# Patient Record
Sex: FEMALE | Race: BLACK OR AFRICAN AMERICAN | NOT HISPANIC OR LATINO | Employment: OTHER | URBAN - METROPOLITAN AREA
[De-identification: names, ages, dates, MRNs, and addresses within clinical notes are randomized per-mention and may not be internally consistent; named-entity substitution may affect disease eponyms.]

---

## 2021-02-05 ENCOUNTER — OFFICE VISIT (OUTPATIENT)
Dept: URGENT CARE | Facility: URGENT CARE | Age: 81
End: 2021-02-05
Payer: COMMERCIAL

## 2021-02-05 VITALS
HEART RATE: 83 BPM | DIASTOLIC BLOOD PRESSURE: 72 MMHG | TEMPERATURE: 98.5 F | SYSTOLIC BLOOD PRESSURE: 140 MMHG | OXYGEN SATURATION: 98 %

## 2021-02-05 DIAGNOSIS — R53.83 OTHER FATIGUE: ICD-10-CM

## 2021-02-05 DIAGNOSIS — R42 DIZZINESS: Primary | ICD-10-CM
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
## 2021-02-05 DIAGNOSIS — E11.9 TYPE 2 DIABETES MELLITUS WITHOUT COMPLICATION, WITHOUT LONG-TERM CURRENT USE OF INSULIN (H): ICD-10-CM

## 2021-02-05 DIAGNOSIS — R42 VERTIGO: ICD-10-CM

## 2021-02-05 DIAGNOSIS — I10 HYPERTENSION, WELL CONTROLLED: ICD-10-CM

## 2021-02-05 PROCEDURE — 99204 OFFICE O/P NEW MOD 45 MIN: CPT | Performed by: INTERNAL MEDICINE

## 2021-02-05 RX ORDER — LOSARTAN POTASSIUM AND HYDROCHLOROTHIAZIDE 12.5; 5 MG/1; MG/1
1 TABLET ORAL
COMMUNITY
Start: 2021-01-13 | End: 2021-02-25 | Stop reason: ALTCHOICE

## 2021-02-05 RX ORDER — ATORVASTATIN CALCIUM 40 MG/1
40 TABLET, FILM COATED ORAL
COMMUNITY
Start: 2021-01-19 | End: 2021-02-24

## 2021-02-05 ASSESSMENT — ENCOUNTER SYMPTOMS
NAUSEA: 0
HEADACHES: 0
SHORTNESS OF BREATH: 0
DECREASED CONCENTRATION: 0
CONFUSION: 0
FATIGUE: 0
FEVER: 0
PALPITATIONS: 0
SPEECH DIFFICULTY: 0
FACIAL ASYMMETRY: 0
NUMBNESS: 0
SORE THROAT: 0
VOMITING: 0
WEAKNESS: 0
DIFFICULTY URINATING: 0

## 2021-02-05 NOTE — PROGRESS NOTES
Phone :  janine  Off phone  at 11:32    ASSESSMENT:      ICD-10-CM    1. Dizziness  R42    2. Fall, initial encounter  W19.XXXA    3. Type 2 diabetes mellitus without complication, without long-term current use of insulin (H)  E11.9    4. Hypertension, well controlled  I10    5. Vertigo  R42    6. Other fatigue  R53.83         Medical Decision Making:  New onset dizziness, sounds vertigo-like with unstable gait, greatest concern would be vertebral basilar insufficiency/stroke  Refer to emergency for evaluation and treatment.    Other causes could include cardiac ischemia,arrhythmia, electrolyte derangement, hypoglycemia, dehydration    Serious Comorbid Conditions:  Adult:  Diabetes and HTN    PLAN:  Followup:    Transfer to ED via private car    Patient Instructions   Concern for new onset dizziness with history diabetes mellitus and age, could be potential stroke    Please take to ER for evaluation.  They will evaluation for stroke, dehydration, abnormal electrolytes, blood sugars, heart problems    Daughter to drive to ER          SUBJECTIVE:   Dano Bowser is a 80 year old female presenting with a chief complaint of   Chief Complaint   Patient presents with     Urgent Care     Dizziness     dizziness x 3 days. fell when she got out ot bed Wednesday.          She is a new patient of [facility].    accompanied by her daughter who also gives history  Used phone  :        Dizziness Symptom Onset: 3 day(s) ago.  When nicole from bed, she fell  patient told her daughter she was dizzy  All day yesterday had dizziness  No previous occurrence      Character: everything feels upside down, hard to get up.  Associated Symptoms: none.  After lies down & gets up - causes dizziness  Makes hard to walk    Cardiac risk factors: diabetes mellitus, hypertension and hypercholesterolemia/hyperlipidemia.    Home /80s    denies stoke or heart problem    [facility]   & told looked good by letter  Reviewed letter 1.14.2021.  CMP Lipid normal   Higher blood sugars -recommended diet changes    Reviewed labs _ a1c 7.3,  ,  LDL 48  Na 134    Review of Systems   Constitutional: Negative for fatigue (sometimes) and fever.   HENT: Negative for sore throat.    Eyes: Negative for visual disturbance.   Respiratory: Negative for shortness of breath. Cough: sometimes.    Cardiovascular: Negative for chest pain, palpitations and leg swelling.   Gastrointestinal: Negative for nausea and vomiting.   Genitourinary: Negative.  Negative for difficulty urinating.   Musculoskeletal: Negative for gait problem (gait is not the same as before after fall).   Skin: Negative for rash.        Itchy skin.  tx HC cream   Neurological: Negative for syncope, facial asymmetry, speech difficulty, weakness, numbness and headaches.   Psychiatric/Behavioral: Negative for confusion and decreased concentration.        Takes medications.  Remembers to do this       Past Medical History:   Diagnosis Date     Benign essential hypertension      Diabetes mellitus (H)      High cholesterol      No family history on file.  Current Outpatient Medications   Medication Sig Dispense Refill     atorvastatin (LIPITOR) 40 MG tablet Take 40 mg by mouth       losartan-hydrochlorothiazide (HYZAAR) 50-12.5 MG tablet Take 1 tablet by mouth       metFORMIN (GLUCOPHAGE) 850 MG tablet Take 850 mg by mouth       Social History     Tobacco Use     Smoking status: Never Smoker     Smokeless tobacco: Never Used   Substance Use Topics     Alcohol use: Not on file       OBJECTIVE  BP (!) 140/72   Pulse 83   Temp 98.5  F (36.9  C) (Oral)   SpO2 98%     Physical Exam  Vitals signs reviewed.   Constitutional:       General: She is not in acute distress.     Appearance: Normal appearance. She is not ill-appearing, toxic-appearing or diaphoretic.   HENT:      Mouth/Throat:      Mouth: Mucous membranes are moist.   Eyes:       Conjunctiva/sclera: Conjunctivae normal.   Cardiovascular:      Rate and Rhythm: Normal rate and regular rhythm.      Pulses: Normal pulses.      Heart sounds: Normal heart sounds.   Pulmonary:      Effort: Pulmonary effort is normal.      Breath sounds: Normal breath sounds.   Abdominal:      Palpations: Abdomen is soft.   Musculoskeletal:      Right lower leg: No edema.      Left lower leg: No edema.   Neurological:      Mental Status: She is alert and oriented to person, place, and time.      Cranial Nerves: No cranial nerve deficit.      Coordination: Coordination abnormal.      Gait: Gait abnormal (unstable gait).      Comments: Standing causing dizziness, unable to participate in coordination with finger-nose-finger exam   Psychiatric:         Mood and Affect: Mood normal.         Behavior: Behavior normal.         Thought Content: Thought content normal.         Judgment: Judgment normal.         Labs:  No results found for this or any previous visit (from the past 24 hour(s)).

## 2021-02-05 NOTE — PATIENT INSTRUCTIONS
Concern for new onset dizziness with history diabetes mellitus and age, could be potential stroke    Please take to ER for evaluation.  They will evaluation for stroke, dehydration, abnormal electrolytes, blood sugars, heart problems    Daughter to drive to ER

## 2021-02-07 PROBLEM — E78.5 HYPERLIPIDEMIA: Status: ACTIVE | Noted: 2018-06-30

## 2021-02-07 PROBLEM — R22.2 MASS OF CHEST WALL, RIGHT: Status: RESOLVED | Noted: 2018-06-30 | Resolved: 2021-02-07

## 2021-02-07 PROBLEM — E55.9 VITAMIN D DEFICIENCY: Status: ACTIVE | Noted: 2018-06-30

## 2021-02-07 PROBLEM — K57.10: Status: ACTIVE | Noted: 2018-06-30

## 2021-02-07 PROBLEM — I10 ESSENTIAL HYPERTENSION: Status: ACTIVE | Noted: 2018-06-30

## 2021-02-07 PROBLEM — E04.2 MULTINODULAR GOITER: Status: ACTIVE | Noted: 2018-06-30

## 2021-02-07 PROBLEM — E87.1 ACUTE HYPONATREMIA: Status: RESOLVED | Noted: 2018-06-30 | Resolved: 2021-02-07

## 2021-02-07 PROBLEM — E11.9 CONTROLLED TYPE 2 DIABETES MELLITUS WITHOUT COMPLICATION, WITHOUT LONG-TERM CURRENT USE OF INSULIN (H): Status: ACTIVE | Noted: 2018-06-30

## 2021-02-07 PROBLEM — E87.1 ACUTE HYPONATREMIA: Status: ACTIVE | Noted: 2018-06-30

## 2021-02-07 PROBLEM — K44.9 HIATAL HERNIA: Status: ACTIVE | Noted: 2018-06-30

## 2021-02-07 PROBLEM — R22.2 MASS OF CHEST WALL, RIGHT: Status: ACTIVE | Noted: 2018-06-30

## 2021-02-08 ENCOUNTER — TELEPHONE (OUTPATIENT)
Dept: FAMILY MEDICINE | Facility: CLINIC | Age: 81
End: 2021-02-08

## 2021-02-08 ENCOUNTER — TELEPHONE (OUTPATIENT)
Dept: PEDIATRICS | Facility: CLINIC | Age: 81
End: 2021-02-08

## 2021-02-08 ENCOUNTER — VIRTUAL VISIT (OUTPATIENT)
Dept: FAMILY MEDICINE | Facility: CLINIC | Age: 81
End: 2021-02-08
Payer: COMMERCIAL

## 2021-02-08 DIAGNOSIS — R42 DIZZINESS: ICD-10-CM

## 2021-02-08 DIAGNOSIS — N39.0 E-COLI UTI: ICD-10-CM

## 2021-02-08 DIAGNOSIS — E11.9 CONTROLLED TYPE 2 DIABETES MELLITUS WITHOUT COMPLICATION, WITHOUT LONG-TERM CURRENT USE OF INSULIN (H): ICD-10-CM

## 2021-02-08 DIAGNOSIS — Z86.79 H/O LEFT BUNDLE BRANCH BLOCK: ICD-10-CM

## 2021-02-08 DIAGNOSIS — B96.20 E-COLI UTI: ICD-10-CM

## 2021-02-08 DIAGNOSIS — R53.83 FATIGUE, UNSPECIFIED TYPE: ICD-10-CM

## 2021-02-08 DIAGNOSIS — R00.2 PALPITATIONS: ICD-10-CM

## 2021-02-08 DIAGNOSIS — B96.89 UTI DUE TO KLEBSIELLA SPECIES: ICD-10-CM

## 2021-02-08 DIAGNOSIS — E55.9 VITAMIN D DEFICIENCY: ICD-10-CM

## 2021-02-08 DIAGNOSIS — Z91.81 PERSONAL HISTORY OF FALL: ICD-10-CM

## 2021-02-08 DIAGNOSIS — E87.1 HYPONATREMIA: ICD-10-CM

## 2021-02-08 DIAGNOSIS — N39.0 UTI DUE TO KLEBSIELLA SPECIES: ICD-10-CM

## 2021-02-08 DIAGNOSIS — R26.81 UNSTEADY GAIT: ICD-10-CM

## 2021-02-08 DIAGNOSIS — I10 ESSENTIAL HYPERTENSION: ICD-10-CM

## 2021-02-08 DIAGNOSIS — D64.9 CHRONIC ANEMIA: ICD-10-CM

## 2021-02-08 DIAGNOSIS — E04.2 MULTINODULAR GOITER: ICD-10-CM

## 2021-02-08 DIAGNOSIS — E78.5 HYPERLIPIDEMIA, UNSPECIFIED HYPERLIPIDEMIA TYPE: ICD-10-CM

## 2021-02-08 DIAGNOSIS — Z09 HOSPITAL DISCHARGE FOLLOW-UP: Primary | ICD-10-CM

## 2021-02-08 DIAGNOSIS — E11.9 CONTROLLED TYPE 2 DIABETES MELLITUS WITHOUT COMPLICATION, WITHOUT LONG-TERM CURRENT USE OF INSULIN (H): Primary | ICD-10-CM

## 2021-02-08 PROCEDURE — 99215 OFFICE O/P EST HI 40 MIN: CPT | Mod: 95 | Performed by: FAMILY MEDICINE

## 2021-02-08 RX ORDER — SULFAMETHOXAZOLE AND TRIMETHOPRIM 400; 80 MG/1; MG/1
1 TABLET ORAL 2 TIMES DAILY
Qty: 14 TABLET | Refills: 0 | Status: SHIPPED | OUTPATIENT
Start: 2021-02-08 | End: 2021-02-15

## 2021-02-08 RX ORDER — GLUCOSAMINE HCL/CHONDROITIN SU 500-400 MG
CAPSULE ORAL
Qty: 100 EACH | Refills: 3 | Status: SHIPPED | OUTPATIENT
Start: 2021-02-08 | End: 2021-03-17

## 2021-02-08 RX ORDER — CALCIUM CITRATE/VITAMIN D3 200MG-6.25
1 TABLET ORAL DAILY
Qty: 100 STRIP | Refills: 0 | Status: SHIPPED | OUTPATIENT
Start: 2021-02-08 | End: 2021-02-08

## 2021-02-08 RX ORDER — LANCETS
EACH MISCELLANEOUS
Qty: 100 EACH | Refills: 6 | Status: SHIPPED | OUTPATIENT
Start: 2021-02-08

## 2021-02-08 NOTE — PATIENT INSTRUCTIONS
Glad doing better  Recheck labs this week  See back in couple weeks  Bactrim twice a day with food 7 days fo rurine infection  See me in 2 weeks  Or earlier depending on value and symptoms  depending on how doing may need cardiolgy and neurology given LBB, age and dizziness, they want to wait on this till labs and apt done  Will wait to refill med and address disability parking at apt with me in person in 1 to 2 weeks  continue care with eye   Go to the  ER if worse in any way

## 2021-02-08 NOTE — PROGRESS NOTES
Dano is a 80 year old who is being evaluated via a billable telephone visit.      What phone number would you like to be contacted at? On file  How would you like to obtain your AVS? Mail a copy  Assessment & Plan     Hospital discharge follow-up  Seems to be doing better  Recheck labs this week given low sodium and chronic anemia  See back in couple weeks  Will make further recommendations once labs seen and evaluate in person  Will need to review records in care every where in more detail as limited at first time apt today to get access to records and update rest of PMH/ PSH/ FH/ SOc , etc  Bactrim twice a day with food 7 days for what appears to be Klebsiella and E coli UTI   See me in person 2 weeks  Or earlier depending on lab values and symptoms  Depending on how she is doing may need cardiology and neurology given LBBB, age and dizziness, they want to wait on this till labs and apt done  Will wait to refill med and address disability parking at apt with me in person in 1 to 2 weeks  Continue care with eye   To go to the  ER if worse in any way    - CBC with platelets differential; Future  - Comprehensive metabolic panel; Future    Dizziness  Improved recheck labs and consider neuro/ pt consult  - CBC with platelets differential; Future  - Comprehensive metabolic panel; Future  - CARDIOLOGY EVAL ADULT REFERRAL; Future  - NEUROLOGY ADULT REFERRAL    Palpitations  Resolved   - CBC with platelets differential; Future  - Comprehensive metabolic panel; Future  - CARDIOLOGY EVAL ADULT REFERRAL; Future    H/O left bundle branch block  Seen on EKG on 2/5/2021 in ER, not sure if new or old, will need to review outside chart in more detail. Currently no chest symptoms  - CARDIOLOGY EVAL ADULT REFERRAL; Future    Hyponatremia  Acute intermittent has had in the past , on a diuretic. Will see what repeat labs show  - Comprehensive metabolic panel; Future    UTI due to Klebsiella species  - CBC with platelets differential;  Future  - Comprehensive metabolic panel; Future  - sulfamethoxazole-trimethoprim (BACTRIM) 400-80 MG tablet; Take 1 tablet by mouth 2 times daily for 7 days    E-coli UTI  - CBC with platelets differential; Future  - Comprehensive metabolic panel; Future  - sulfamethoxazole-trimethoprim (BACTRIM) 400-80 MG tablet; Take 1 tablet by mouth 2 times daily for 7 days    Fatigue, unspecified type  Resolved   - CBC with platelets differential; Future  - Comprehensive metabolic panel; Future    Unsteady gait  Improved   - CBC with platelets differential; Future  - Comprehensive metabolic panel; Future    Personal history of fall  - CBC with platelets differential; Future  - NEUROLOGY ADULT REFERRAL    Chronic anemia  Hb in past 11.1 now in er was 10.6. no hx of bruising or bleeding form anywhere. Will recheck and review outside records and go from there. Could be anemia or chronic disease  - Iron and iron binding capacity; Future  - Vitamin B12; Future    Controlled type 2 diabetes mellitus without complication, without long-term current use of insulin (H)  On metformin, will see if kidney function allow to continue or needs tweaking, on arb and statin. Will discuss as at next apt. Risk benefit given age to be considered as well.   - Comprehensive metabolic panel; Future  - Lipid panel reflex to direct LDL Fasting; Future  - TSH with free T4 reflex; Future  - Hemoglobin A1c; Future  - Albumin Random Urine Quantitative with Creat Ratio; Future  - blood glucose (TRUE METRIX BLOOD GLUCOSE TEST) test strip; 1 strip by In Vitro route daily Use to test blood sugar 1 times daily or as directed.    Essential hypertension  Noted stable on losartan hydrochlorothiazide  - Comprehensive metabolic panel; Future  - TSH with free T4 reflex; Future  - Albumin Random Urine Quantitative with Creat Ratio; Future    Hyperlipidemia, unspecified hyperlipidemia type  On statin  - Lipid panel reflex to direct LDL Fasting; Future  - TSH with free T4  reflex; Future    Multinodular goiter  - TSH with free T4 reflex; Future    Vitamin D deficiency  - Vitamin D Deficiency; Future    Review of external notes as documented above   Review of prior external note(s) from - [facility]Everywhere information from [facility]  reviewed  Review of the result(s) of each unique test - cbc, cmp, ek, cxr, mri, ua, Ucx  Independent interpretation of a test performed by another physician/other qualified health care professional (not separately reported) - UCx  Diagnosis or treatment significantly limited by social determinants of health - spoke with help of daughter and , cultural and linguistic and hearing barriers in place     65 minutes spent on the date of the encounter doing chart review, history and exam, documentation and further activities as noted above    Regular exercise  See Patient Instructions    Return in about 2 weeks (around 2/22/2021) for Follow up, with me, in person.    Cee Lozano MD  [facility]    Fili Matson is a 80 year old who presents to clinic today for the following health issues  accompanied by her Maltese  and daughter:    HPI     ED/UC Follow up:  Facility:  Emergency Dept  Date of visit: 2/5/2021  Reason for visit:   Dizziness (Primary Dx);   Chest pain, unspecified type;   Hyponatremia   pt state not chest pain but fast heartbeat   Current Status:  Seem dizziness sometimes when laying down, no pain, no fast heart beat.  Pt state can she drink V8 juice for dinner, is it okay to drink v8.          80 yr old [place] lady with hx of DM on metformin( HBA1c 7.3 in 1/[facility]), HLD on Lipitor, HTN on losartan-hydrochlorothiazide, small asymptomatic hiatal hernia noted on CT in 2018 ad asymptomatic diverticulum of colon noted at that time too, vit d deficiency on calcium and vit d, h of resolved hyponatremia secondary to GE in 2018 and a right chest wall  lipoma s/p excision in 2018, with SNHL supposed to be wearing hearing aids under care of ENT / audiology at [facility], resolved ceruminous, hx of periodontal disease & tooth extraction of a fractured tooth in nov 2019.  Under care of dentist and allergic conjunctivitis under care of eye, under care of PCP Chelsea Dominguez at [facility] many years, last got labs for her and meds refilled in Jan 2021.  Seen in  first time in [place] system on 2/5 for complain of dizziness vertigo, fatigue, hx of a fall & unsteady gait. Was referred to the ER to evaluate for stroke. Apparently seen at [facility] ER for chest pain that same day. No notes to review of that ER visit prior to apt time & limited records could only be seen using care every where prior to actual visit.    But a follow up apt was made by [facility] ER to follow up on recent ER visit for chest pain today by telephone with this provider. New to this provider and clinic. There is a cultural and lingual barrier ( speaks Hebrew) and an  was to be utilized.   Also just prior to the apt , [facility] ER informed triage that Patient was seen in their ER Friday 2/5/2021 for Chest Pain. Noted had been asymptomatic for urinary concerns but they did do a UA/UC and patient was not sent home on any antibiotics & later Culture grew out: >100,000 klebsiella pneumoniae & > 100,000 e coli, noting that Both were pan sensitive EXCEPT the Klebsiella was resistant to nitrofurantoin. Missed apt at 1120 as daughter at work and rescheduled to 420 pm today     TE done with Hebrew  and daughter. Patient has SNHL and does not speak on phone. Only hear daughter  Notes transferred to [place] due to insurance change    Notes form [facility] ER in care every where reviewed as below:    On 2/5/2021 80-year-old female, past medical history of type 2 diabetes, hyponatremia, who presents emergency department for evaluation of dizziness. Patient obtained through  . History limited due to patient's hearing impairment. For the last 2 days, patient has had dizziness. She states she got out of bed 2 days ago and fell. She did not seek medical evaluation at that time. Since that fall, she has had persistent dizziness and  heart pain . Patient was seen at an urgent care today for her symptoms. There was concern for posterior circulation stroke, referred to the emergency department. She is able to walk but takes short steps. No weakness in her arms or legs. No vision changes. No abdominal pain. No difficulty breathing. No recent fever or chills. No cough. No other concerns.   Mildly hypertensive on arrival otherwise normal vitals. There is very limited ability to obtain a full neurologic exam given that the patient is very hard of hearing and non-English speaking. However, I do not appreciate any focal neurologic deficits. Differential brought would include stroke, electrolyte derangement, anemia, sepsis, ACS and others. Plan for laboratory workup, chest x-ray and MRI. Disposition pending.  Venus Ervin MD    Clarifies she did not have chest pain just dizziness, and when the doctor was examining her felt her heart was beating fast  1339 Urinalysis shows small amount leukocyte esterase and white cells, however contaminated with mucus. History inconsistent with UTI   [GG]   1350 Mild leukocytosis, however similar to values obtained last year.   [GG]   1350 IMPRESSION: Shallow inspiration. Heart size is normal. Minimal plate like atelectasis at the left lung base. The right lung is clear. No pleural effusions. No pneumothorax. The bones are demineralized. There is degenerative change in the spine and left shoulder.   [GG]   1410 Sodium(!): 127 [GG]   1410 Hemoglobin at baseline.   [GG]   1421 Troponin I: 0.03 [GG]   1423 EKG shows LBBB morphology, sinus rhythm, 2-3mm ST elevation in leads V2-V4, does not meet sgarbossa criteria. No previous EKG for comparison   [GG]    1540 Sign out received, assumed care at this time.   [BS]   3939 IMPRESSION:  1. No evidence of acute infarction or other acute intracranial abnormality.  2. Findings compatible with moderate small vessel ischemic change.  3. Mild generalized atrophy.   MR Brain WO IV Cont [BS]   9533 Workup reviewed:  -Na 127 (134)  -Mild leukocytosis with WBC 13.1  -UA with many bacteria, 14 WBC, small LE -- suspect 2/2 contamination as patient without any urinary symptoms or tenderness on exam. Will send to Cx   [BS]   5314 Reassessed patient. Endorsing some dizziness but no chest pain or other symptoms. Discussed that feel admission for further workup and management about be reasonable as she is still experiencing dizziness, however, patient is adamant that she does not want to stay and wishes to leave. Discussed risks and benefits of this decision and I believe patient has the capacity to make the decision. Will discuss with CM to help coordinate follow up with PCP on Monday    Notes was hydrated with 0.9 % normal saline. Vertigo resolved, dizziness better now only when gets up suddenly. No longer with palpitations. Feeling fine now. Still having dizziness when lying down on getting up from lying down , has to get up slowly as feels dizzy    Currently No fever or chills, no headache, no double or blurry vision, no facial pain, earache, sore throat, runny nose, post nasal drip, no trouble smelling, tasting or swallowing, no cough , no chest pain, trouble breathing or palpitations,   No abdominal pain, heart burn, reflux, nausea or vomiting  Hx of diarrhea with bernice products, avoids dairy  No constipation, no blood in stools or black stools, no weight loss or night sweats.   Has decreased hearing  daughter and  did all the talking    Notes some Urge incontinence & wears diapers. No dysuria, hematuria, frequency, hesitancy, No pelvic complaints.   No leg swelling or joint pain. No rash.    Uses hydrocortisone for a  chronic skin patch    DM : when sick FS was 138 , when in ER given saline, went down to , when done with saline measured again & went up to 144, wanted more saline to lower BP and was advised to stay overnight but didn't  HTN not checking at home, taking losartan hydrochlorothiazide  HLD on statin  Taking meds as previous  multinodular goiter, TSH wnl in past   Hyponatremia in past as well when dehydrated    No more falls, fatigue improved    Will need med refills by next month    Review of Systems   Constitutional, HEENT, cardiovascular, pulmonary, GI, , musculoskeletal, neuro, skin, endocrine and psych systems are negative, except as otherwise noted.      Objective           Vitals:  No vitals were obtained today due to virtual visit.    Physical Exam   healthy, alert, no distress and cooperative  PSYCH: Alert but unable to evaluate more than that as hard of hearing and daughter did all the talking on the phone, RESP: No cough, no audible wheezing, able to talk in full sentences  Remainder of exam unable to be completed due to telephone visits    No results found for any previous visit.     No results found for any visits on 02/08/21.        Phone call duration: 40 minutes

## 2021-02-08 NOTE — TELEPHONE ENCOUNTER
Received call from [facility] on [facility]    Diabetic test strips that were ordered today are not covered by pt's insurance  Pharmacy would like to substitute with One Touch Verio  If okay, then they need new orders orders for all diabetic testing supplies  Meter, lancets, strips    Thank you  Will Hastings RN on 2/8/2021 at 5:25 PM

## 2021-02-08 NOTE — TELEPHONE ENCOUNTER
[facility]  Patient was seen in their ER Friday 2/5/2021 for Chest Pain  Was asymptomatic for urinary concerns but they did do a UA/UC and patient was not sent home on any antibiotics  Culture grew out:  >100,000 klebsiella pneumoniae  > 100,000 e coli  Both are pan sensitive EXCEPT the Klebsiella is resistant to nitrofurantoin.  Patient has 1120 virtual with an  with Dr Lozano at 1120 today.  Preethi Recinos RN  [facility]

## 2021-02-09 ENCOUNTER — APPOINTMENT (OUTPATIENT)
Dept: INTERPRETER SERVICES | Facility: CLINIC | Age: 81
End: 2021-02-09
Payer: COMMERCIAL

## 2021-02-15 DIAGNOSIS — E55.9 VITAMIN D DEFICIENCY: ICD-10-CM

## 2021-02-15 DIAGNOSIS — R26.81 UNSTEADY GAIT: ICD-10-CM

## 2021-02-15 DIAGNOSIS — I10 ESSENTIAL HYPERTENSION: ICD-10-CM

## 2021-02-15 DIAGNOSIS — Z91.81 PERSONAL HISTORY OF FALL: ICD-10-CM

## 2021-02-15 DIAGNOSIS — R42 DIZZINESS: ICD-10-CM

## 2021-02-15 DIAGNOSIS — E78.5 HYPERLIPIDEMIA, UNSPECIFIED HYPERLIPIDEMIA TYPE: ICD-10-CM

## 2021-02-15 DIAGNOSIS — E11.9 CONTROLLED TYPE 2 DIABETES MELLITUS WITHOUT COMPLICATION, WITHOUT LONG-TERM CURRENT USE OF INSULIN (H): ICD-10-CM

## 2021-02-15 DIAGNOSIS — D64.9 CHRONIC ANEMIA: ICD-10-CM

## 2021-02-15 DIAGNOSIS — R00.2 PALPITATIONS: ICD-10-CM

## 2021-02-15 DIAGNOSIS — N39.0 E-COLI UTI: ICD-10-CM

## 2021-02-15 DIAGNOSIS — B96.89 UTI DUE TO KLEBSIELLA SPECIES: ICD-10-CM

## 2021-02-15 DIAGNOSIS — E87.1 HYPONATREMIA: ICD-10-CM

## 2021-02-15 DIAGNOSIS — B96.20 E-COLI UTI: ICD-10-CM

## 2021-02-15 DIAGNOSIS — E04.2 MULTINODULAR GOITER: ICD-10-CM

## 2021-02-15 DIAGNOSIS — Z09 HOSPITAL DISCHARGE FOLLOW-UP: ICD-10-CM

## 2021-02-15 DIAGNOSIS — N39.0 UTI DUE TO KLEBSIELLA SPECIES: ICD-10-CM

## 2021-02-15 DIAGNOSIS — R53.83 FATIGUE, UNSPECIFIED TYPE: ICD-10-CM

## 2021-02-15 LAB
ALBUMIN SERPL-MCNC: 3.2 G/DL (ref 3.4–5)
ALP SERPL-CCNC: 148 U/L (ref 40–150)
ALT SERPL W P-5'-P-CCNC: 30 U/L (ref 0–50)
ANION GAP SERPL CALCULATED.3IONS-SCNC: 7 MMOL/L (ref 3–14)
AST SERPL W P-5'-P-CCNC: 19 U/L (ref 0–45)
BASOPHILS NFR BLD AUTO: 0.5 %
BILIRUB SERPL-MCNC: 0.3 MG/DL (ref 0.2–1.3)
BUN SERPL-MCNC: 23 MG/DL (ref 7–30)
CALCIUM SERPL-MCNC: 9.6 MG/DL (ref 8.5–10.1)
CHLORIDE SERPL-SCNC: 100 MMOL/L (ref 94–109)
CHOLEST SERPL-MCNC: 110 MG/DL
CO2 SERPL-SCNC: 25 MMOL/L (ref 20–32)
CREAT SERPL-MCNC: 1.18 MG/DL (ref 0.52–1.04)
CREAT UR-MCNC: 93 MG/DL
DIFFERENTIAL METHOD BLD: ABNORMAL
EOSINOPHIL NFR BLD AUTO: 2.2 %
ERYTHROCYTE [DISTWIDTH] IN BLOOD BY AUTOMATED COUNT: 12.9 % (ref 10–15)
GFR SERPL CREATININE-BSD FRML MDRD: 43 ML/MIN/{1.73_M2}
GLUCOSE SERPL-MCNC: 133 MG/DL (ref 70–99)
HBA1C MFR BLD: 6.3 % (ref 0–5.6)
HCT VFR BLD AUTO: 33.4 % (ref 35–47)
HDLC SERPL-MCNC: 53 MG/DL
HGB BLD-MCNC: 10.9 G/DL (ref 11.7–15.7)
IRON SATN MFR SERPL: 16 % (ref 15–46)
IRON SERPL-MCNC: 63 UG/DL (ref 35–180)
LDLC SERPL CALC-MCNC: 34 MG/DL
LYMPHOCYTES NFR BLD AUTO: 20.6 %
MCH RBC QN AUTO: 28.7 PG (ref 26.5–33)
MCHC RBC AUTO-ENTMCNC: 32.6 G/DL (ref 31.5–36.5)
MCV RBC AUTO: 88 FL (ref 78–100)
MICROALBUMIN UR-MCNC: 5 MG/L
MICROALBUMIN/CREAT UR: 5.49 MG/G CR (ref 0–25)
MONOCYTES NFR BLD AUTO: 7.2 %
NEUTROPHILS NFR BLD AUTO: 69.5 %
NONHDLC SERPL-MCNC: 57 MG/DL
PLATELET # BLD AUTO: 378 10E9/L (ref 150–450)
POTASSIUM SERPL-SCNC: 4.7 MMOL/L (ref 3.4–5.3)
PROT SERPL-MCNC: 7.6 G/DL (ref 6.8–8.8)
RBC # BLD AUTO: 3.8 10E12/L (ref 3.8–5.2)
SODIUM SERPL-SCNC: 132 MMOL/L (ref 133–144)
TIBC SERPL-MCNC: 382 UG/DL (ref 240–430)
TRIGL SERPL-MCNC: 117 MG/DL
TSH SERPL DL<=0.005 MIU/L-ACNC: 0.52 MU/L (ref 0.4–4)
VIT B12 SERPL-MCNC: 1081 PG/ML (ref 193–986)
WBC # BLD AUTO: 12 10E9/L (ref 4–11)

## 2021-02-15 PROCEDURE — 82043 UR ALBUMIN QUANTITATIVE: CPT | Performed by: FAMILY MEDICINE

## 2021-02-15 PROCEDURE — 80050 GENERAL HEALTH PANEL: CPT | Performed by: FAMILY MEDICINE

## 2021-02-15 PROCEDURE — 80061 LIPID PANEL: CPT | Performed by: FAMILY MEDICINE

## 2021-02-15 PROCEDURE — 36415 COLL VENOUS BLD VENIPUNCTURE: CPT | Performed by: FAMILY MEDICINE

## 2021-02-15 PROCEDURE — 82607 VITAMIN B-12: CPT | Performed by: FAMILY MEDICINE

## 2021-02-15 PROCEDURE — 83036 HEMOGLOBIN GLYCOSYLATED A1C: CPT | Performed by: FAMILY MEDICINE

## 2021-02-15 PROCEDURE — 83540 ASSAY OF IRON: CPT | Performed by: FAMILY MEDICINE

## 2021-02-15 PROCEDURE — 82306 VITAMIN D 25 HYDROXY: CPT | Performed by: FAMILY MEDICINE

## 2021-02-15 PROCEDURE — 83550 IRON BINDING TEST: CPT | Performed by: FAMILY MEDICINE

## 2021-02-15 NOTE — LETTER
February 16, 2021      Dano Bowser  [id number]  [place] [address]        Dear ,    We are writing to inform you of your test results.    Results within acceptable limits.  -Microalbumin (urine protein) test is normal.  ADVISE: rechecking this annually    Resulted Orders   Vitamin B12   Result Value Ref Range    Vitamin B12 1,081 (H) 193 - 986 pg/mL   Iron and iron binding capacity   Result Value Ref Range    Iron 63 35 - 180 ug/dL    Iron Binding Cap 382 240 - 430 ug/dL    Iron Saturation Index 16 15 - 46 %   Albumin Random Urine Quantitative with Creat Ratio   Result Value Ref Range    Creatinine Urine 93 mg/dL    Albumin Urine mg/L 5 mg/L    Albumin Urine mg/g Cr 5.49 0 - 25 mg/g Cr   Hemoglobin A1c   Result Value Ref Range    Hemoglobin A1C 6.3 (H) 0 - 5.6 %      Comment:      Normal <5.7% Prediabetes 5.7-6.4%  Diabetes 6.5% or higher - adopted from ADA   consensus guidelines.     TSH with free T4 reflex   Result Value Ref Range    TSH 0.52 0.40 - 4.00 mU/L   Lipid panel reflex to direct LDL Fasting   Result Value Ref Range    Cholesterol 110 <200 mg/dL    Triglycerides 117 <150 mg/dL      Comment:      Fasting specimen    HDL Cholesterol 53 >49 mg/dL    LDL Cholesterol Calculated 34 <100 mg/dL      Comment:      Desirable:       <100 mg/dl    Non HDL Cholesterol 57 <130 mg/dL   Comprehensive metabolic panel   Result Value Ref Range    Sodium 132 (L) 133 - 144 mmol/L    Potassium 4.7 3.4 - 5.3 mmol/L    Chloride 100 94 - 109 mmol/L    Carbon Dioxide 25 20 - 32 mmol/L    Anion Gap 7 3 - 14 mmol/L    Glucose 133 (H) 70 - 99 mg/dL      Comment:      Fasting specimen    Urea Nitrogen 23 7 - 30 mg/dL    Creatinine 1.18 (H) 0.52 - 1.04 mg/dL    GFR Estimate 43 (L) >60 mL/min/[1.73_m2]      Comment:      Non  GFR Calc  Starting 12/18/2018, serum creatinine based estimated GFR (eGFR) will be   calculated using the Chronic Kidney Disease Epidemiology Collaboration   (CKD-EPI) equation.       GFR Estimate If Black 50 (L) >60 mL/min/[1.73_m2]      Comment:       GFR Calc  Starting 12/18/2018, serum creatinine based estimated GFR (eGFR) will be   calculated using the Chronic Kidney Disease Epidemiology Collaboration   (CKD-EPI) equation.      Calcium 9.6 8.5 - 10.1 mg/dL    Bilirubin Total 0.3 0.2 - 1.3 mg/dL    Albumin 3.2 (L) 3.4 - 5.0 g/dL    Protein Total 7.6 6.8 - 8.8 g/dL    Alkaline Phosphatase 148 40 - 150 U/L    ALT 30 0 - 50 U/L    AST 19 0 - 45 U/L   CBC with platelets differential   Result Value Ref Range    WBC 12.0 (H) 4.0 - 11.0 10e9/L    RBC Count 3.80 3.8 - 5.2 10e12/L    Hemoglobin 10.9 (L) 11.7 - 15.7 g/dL    Hematocrit 33.4 (L) 35.0 - 47.0 %    MCV 88 78 - 100 fl    MCH 28.7 26.5 - 33.0 pg    MCHC 32.6 31.5 - 36.5 g/dL    RDW 12.9 10.0 - 15.0 %    Platelet Count 378 150 - 450 10e9/L    Diff Method Automated Method     % Neutrophils 69.5 %    % Lymphocytes 20.6 %    % Monocytes 7.2 %    % Eosinophils 2.2 %    % Basophils 0.5 %       If you have any questions or concerns, please call the clinic at the number listed above.       Sincerely,      Cee Lozano MD/nr

## 2021-02-15 NOTE — RESULT ENCOUNTER NOTE
Results within acceptable limits.  -Microalbumin (urine protein) test is normal.  ADVISE: rechecking this annually..

## 2021-02-15 NOTE — LETTER
February 18, 2021      Dano Bowser  [id number]  [facility] [address]        Dear ,    We are writing to inform you of your test results.    Results within acceptable limits.  Normal vitamin D    Resulted Orders   Vitamin B12   Result Value Ref Range    Vitamin B12 1,081 (H) 193 - 986 pg/mL   Iron and iron binding capacity   Result Value Ref Range    Iron 63 35 - 180 ug/dL    Iron Binding Cap 382 240 - 430 ug/dL    Iron Saturation Index 16 15 - 46 %   Albumin Random Urine Quantitative with Creat Ratio   Result Value Ref Range    Creatinine Urine 93 mg/dL    Albumin Urine mg/L 5 mg/L    Albumin Urine mg/g Cr 5.49 0 - 25 mg/g Cr   Hemoglobin A1c   Result Value Ref Range    Hemoglobin A1C 6.3 (H) 0 - 5.6 %      Comment:      Normal <5.7% Prediabetes 5.7-6.4%  Diabetes 6.5% or higher - adopted from ADA   consensus guidelines.     TSH with free T4 reflex   Result Value Ref Range    TSH 0.52 0.40 - 4.00 mU/L   Lipid panel reflex to direct LDL Fasting   Result Value Ref Range    Cholesterol 110 <200 mg/dL    Triglycerides 117 <150 mg/dL      Comment:      Fasting specimen    HDL Cholesterol 53 >49 mg/dL    LDL Cholesterol Calculated 34 <100 mg/dL      Comment:      Desirable:       <100 mg/dl    Non HDL Cholesterol 57 <130 mg/dL   Comprehensive metabolic panel   Result Value Ref Range    Sodium 132 (L) 133 - 144 mmol/L    Potassium 4.7 3.4 - 5.3 mmol/L    Chloride 100 94 - 109 mmol/L    Carbon Dioxide 25 20 - 32 mmol/L    Anion Gap 7 3 - 14 mmol/L    Glucose 133 (H) 70 - 99 mg/dL      Comment:      Fasting specimen    Urea Nitrogen 23 7 - 30 mg/dL    Creatinine 1.18 (H) 0.52 - 1.04 mg/dL    GFR Estimate 43 (L) >60 mL/min/[1.73_m2]      Comment:      Non  GFR Calc  Starting 12/18/2018, serum creatinine based estimated GFR (eGFR) will be   calculated using the Chronic Kidney Disease Epidemiology Collaboration   (CKD-EPI) equation.      GFR Estimate If Black 50 (L) >60 mL/min/[1.73_m2]      Comment:        GFR Calc  Starting 12/18/2018, serum creatinine based estimated GFR (eGFR) will be   calculated using the Chronic Kidney Disease Epidemiology Collaboration   (CKD-EPI) equation.      Calcium 9.6 8.5 - 10.1 mg/dL    Bilirubin Total 0.3 0.2 - 1.3 mg/dL    Albumin 3.2 (L) 3.4 - 5.0 g/dL    Protein Total 7.6 6.8 - 8.8 g/dL    Alkaline Phosphatase 148 40 - 150 U/L    ALT 30 0 - 50 U/L    AST 19 0 - 45 U/L   CBC with platelets differential   Result Value Ref Range    WBC 12.0 (H) 4.0 - 11.0 10e9/L    RBC Count 3.80 3.8 - 5.2 10e12/L    Hemoglobin 10.9 (L) 11.7 - 15.7 g/dL    Hematocrit 33.4 (L) 35.0 - 47.0 %    MCV 88 78 - 100 fl    MCH 28.7 26.5 - 33.0 pg    MCHC 32.6 31.5 - 36.5 g/dL    RDW 12.9 10.0 - 15.0 %    Platelet Count 378 150 - 450 10e9/L    Diff Method Automated Method     % Neutrophils 69.5 %    % Lymphocytes 20.6 %    % Monocytes 7.2 %    % Eosinophils 2.2 %    % Basophils 0.5 %   Vitamin D Deficiency   Result Value Ref Range    Vitamin D Deficiency screening 49 20 - 75 ug/L      Comment:      Season, race, dietary intake, and treatment affect the concentration of   25-hydroxy-Vitamin D. Values may decrease during winter months and increase   during summer months. Values 20-29 ug/L may indicate Vitamin D insufficiency   and values <20 ug/L may indicate Vitamin D deficiency.  Vitamin D determination is routinely performed by an immunoassay specific for   25 hydroxyvitamin D3.  If an individual is on vitamin D2 (ergocalciferol)   supplementation, please specify 25 OH vitamin D2 and D3 level determination by   LCMSMS test [id number].         If you have any questions or concerns, please call the clinic at the number listed above.       Sincerely,      Cee Lozano MD/nr

## 2021-02-15 NOTE — RESULT ENCOUNTER NOTE
Sodium is improved  Thyroid is normal  Rest of liver test and electrolyte wnl  Kidney function slightly decreased likely due to recent bactrim use   Will check again at follow up apt

## 2021-02-15 NOTE — LETTER
February 16, 2021      Dano Bowser  [id number]  [facility] [address]        Dear ,    We are writing to inform you of your test results.    Results within acceptable limits.  -A1C (test of diabetes control the last 2-3 months) is at your goal. Please continue with your current plan. Also, you should make an appointment to see me and recheck your A1C test in 6 months    Resulted Orders   Vitamin B12   Result Value Ref Range    Vitamin B12 1,081 (H) 193 - 986 pg/mL   Iron and iron binding capacity   Result Value Ref Range    Iron 63 35 - 180 ug/dL    Iron Binding Cap 382 240 - 430 ug/dL    Iron Saturation Index 16 15 - 46 %   Albumin Random Urine Quantitative with Creat Ratio   Result Value Ref Range    Creatinine Urine 93 mg/dL    Albumin Urine mg/L 5 mg/L    Albumin Urine mg/g Cr 5.49 0 - 25 mg/g Cr   Hemoglobin A1c   Result Value Ref Range    Hemoglobin A1C 6.3 (H) 0 - 5.6 %      Comment:      Normal <5.7% Prediabetes 5.7-6.4%  Diabetes 6.5% or higher - adopted from ADA   consensus guidelines.     TSH with free T4 reflex   Result Value Ref Range    TSH 0.52 0.40 - 4.00 mU/L   Lipid panel reflex to direct LDL Fasting   Result Value Ref Range    Cholesterol 110 <200 mg/dL    Triglycerides 117 <150 mg/dL      Comment:      Fasting specimen    HDL Cholesterol 53 >49 mg/dL    LDL Cholesterol Calculated 34 <100 mg/dL      Comment:      Desirable:       <100 mg/dl    Non HDL Cholesterol 57 <130 mg/dL   Comprehensive metabolic panel   Result Value Ref Range    Sodium 132 (L) 133 - 144 mmol/L    Potassium 4.7 3.4 - 5.3 mmol/L    Chloride 100 94 - 109 mmol/L    Carbon Dioxide 25 20 - 32 mmol/L    Anion Gap 7 3 - 14 mmol/L    Glucose 133 (H) 70 - 99 mg/dL      Comment:      Fasting specimen    Urea Nitrogen 23 7 - 30 mg/dL    Creatinine 1.18 (H) 0.52 - 1.04 mg/dL    GFR Estimate 43 (L) >60 mL/min/[1.73_m2]      Comment:      Non  GFR Calc  Starting 12/18/2018, serum creatinine based estimated GFR  (eGFR) will be   calculated using the Chronic Kidney Disease Epidemiology Collaboration   (CKD-EPI) equation.      GFR Estimate If Black 50 (L) >60 mL/min/[1.73_m2]      Comment:       GFR Calc  Starting 12/18/2018, serum creatinine based estimated GFR (eGFR) will be   calculated using the Chronic Kidney Disease Epidemiology Collaboration   (CKD-EPI) equation.      Calcium 9.6 8.5 - 10.1 mg/dL    Bilirubin Total 0.3 0.2 - 1.3 mg/dL    Albumin 3.2 (L) 3.4 - 5.0 g/dL    Protein Total 7.6 6.8 - 8.8 g/dL    Alkaline Phosphatase 148 40 - 150 U/L    ALT 30 0 - 50 U/L    AST 19 0 - 45 U/L   CBC with platelets differential   Result Value Ref Range    WBC 12.0 (H) 4.0 - 11.0 10e9/L    RBC Count 3.80 3.8 - 5.2 10e12/L    Hemoglobin 10.9 (L) 11.7 - 15.7 g/dL    Hematocrit 33.4 (L) 35.0 - 47.0 %    MCV 88 78 - 100 fl    MCH 28.7 26.5 - 33.0 pg    MCHC 32.6 31.5 - 36.5 g/dL    RDW 12.9 10.0 - 15.0 %    Platelet Count 378 150 - 450 10e9/L    Diff Method Automated Method     % Neutrophils 69.5 %    % Lymphocytes 20.6 %    % Monocytes 7.2 %    % Eosinophils 2.2 %    % Basophils 0.5 %       If you have any questions or concerns, please call the clinic at the number listed above.       Sincerely,      Cee Lozano MD/nr

## 2021-02-15 NOTE — LETTER
February 16, 2021      Dano Bowser  [id number]  [place] [address]        Dear ,    We are writing to inform you of your test results.    Sodium is improved   Thyroid is normal   Rest of liver test and electrolyte wnl   Kidney function slightly decreased likely due to recent bactrim use   Will check again at follow up apt     Resulted Orders   Iron and iron binding capacity   Result Value Ref Range    Iron 63 35 - 180 ug/dL    Iron Binding Cap 382 240 - 430 ug/dL    Iron Saturation Index 16 15 - 46 %   Hemoglobin A1c   Result Value Ref Range    Hemoglobin A1C 6.3 (H) 0 - 5.6 %      Comment:      Normal <5.7% Prediabetes 5.7-6.4%  Diabetes 6.5% or higher - adopted from ADA   consensus guidelines.     TSH with free T4 reflex   Result Value Ref Range    TSH 0.52 0.40 - 4.00 mU/L   Lipid panel reflex to direct LDL Fasting   Result Value Ref Range    Cholesterol 110 <200 mg/dL    Triglycerides 117 <150 mg/dL      Comment:      Fasting specimen    HDL Cholesterol 53 >49 mg/dL    LDL Cholesterol Calculated 34 <100 mg/dL      Comment:      Desirable:       <100 mg/dl    Non HDL Cholesterol 57 <130 mg/dL   Comprehensive metabolic panel   Result Value Ref Range    Sodium 132 (L) 133 - 144 mmol/L    Potassium 4.7 3.4 - 5.3 mmol/L    Chloride 100 94 - 109 mmol/L    Carbon Dioxide 25 20 - 32 mmol/L    Anion Gap 7 3 - 14 mmol/L    Glucose 133 (H) 70 - 99 mg/dL      Comment:      Fasting specimen    Urea Nitrogen 23 7 - 30 mg/dL    Creatinine 1.18 (H) 0.52 - 1.04 mg/dL    GFR Estimate 43 (L) >60 mL/min/[1.73_m2]      Comment:      Non  GFR Calc  Starting 12/18/2018, serum creatinine based estimated GFR (eGFR) will be   calculated using the Chronic Kidney Disease Epidemiology Collaboration   (CKD-EPI) equation.      GFR Estimate If Black 50 (L) >60 mL/min/[1.73_m2]      Comment:       GFR Calc  Starting 12/18/2018, serum creatinine based estimated GFR (eGFR) will be   calculated using  the Chronic Kidney Disease Epidemiology Collaboration   (CKD-EPI) equation.      Calcium 9.6 8.5 - 10.1 mg/dL    Bilirubin Total 0.3 0.2 - 1.3 mg/dL    Albumin 3.2 (L) 3.4 - 5.0 g/dL    Protein Total 7.6 6.8 - 8.8 g/dL    Alkaline Phosphatase 148 40 - 150 U/L    ALT 30 0 - 50 U/L    AST 19 0 - 45 U/L       If you have any questions or concerns, please call the clinic at the number listed above.       Sincerely,      Cee Lozano MD/nr

## 2021-02-15 NOTE — LETTER
February 16, 2021      Dano Bowser  [id number]  [facility] [address]        Dear ,    We are writing to inform you of your test results.    Cbc stable to prior . Will discuss anemia & white count at apt     Resulted Orders   Vitamin B12   Result Value Ref Range    Vitamin B12 1,081 (H) 193 - 986 pg/mL   Iron and iron binding capacity   Result Value Ref Range    Iron 63 35 - 180 ug/dL    Iron Binding Cap 382 240 - 430 ug/dL    Iron Saturation Index 16 15 - 46 %   Albumin Random Urine Quantitative with Creat Ratio   Result Value Ref Range    Creatinine Urine 93 mg/dL    Albumin Urine mg/L 5 mg/L    Albumin Urine mg/g Cr 5.49 0 - 25 mg/g Cr   Hemoglobin A1c   Result Value Ref Range    Hemoglobin A1C 6.3 (H) 0 - 5.6 %      Comment:      Normal <5.7% Prediabetes 5.7-6.4%  Diabetes 6.5% or higher - adopted from ADA   consensus guidelines.     TSH with free T4 reflex   Result Value Ref Range    TSH 0.52 0.40 - 4.00 mU/L   Lipid panel reflex to direct LDL Fasting   Result Value Ref Range    Cholesterol 110 <200 mg/dL    Triglycerides 117 <150 mg/dL      Comment:      Fasting specimen    HDL Cholesterol 53 >49 mg/dL    LDL Cholesterol Calculated 34 <100 mg/dL      Comment:      Desirable:       <100 mg/dl    Non HDL Cholesterol 57 <130 mg/dL   Comprehensive metabolic panel   Result Value Ref Range    Sodium 132 (L) 133 - 144 mmol/L    Potassium 4.7 3.4 - 5.3 mmol/L    Chloride 100 94 - 109 mmol/L    Carbon Dioxide 25 20 - 32 mmol/L    Anion Gap 7 3 - 14 mmol/L    Glucose 133 (H) 70 - 99 mg/dL      Comment:      Fasting specimen    Urea Nitrogen 23 7 - 30 mg/dL    Creatinine 1.18 (H) 0.52 - 1.04 mg/dL    GFR Estimate 43 (L) >60 mL/min/[1.73_m2]      Comment:      Non  GFR Calc  Starting 12/18/2018, serum creatinine based estimated GFR (eGFR) will be   calculated using the Chronic Kidney Disease Epidemiology Collaboration   (CKD-EPI) equation.      GFR Estimate If Black 50 (L) >60 mL/min/[1.73_m2]       Comment:       GFR Calc  Starting 12/18/2018, serum creatinine based estimated GFR (eGFR) will be   calculated using the Chronic Kidney Disease Epidemiology Collaboration   (CKD-EPI) equation.      Calcium 9.6 8.5 - 10.1 mg/dL    Bilirubin Total 0.3 0.2 - 1.3 mg/dL    Albumin 3.2 (L) 3.4 - 5.0 g/dL    Protein Total 7.6 6.8 - 8.8 g/dL    Alkaline Phosphatase 148 40 - 150 U/L    ALT 30 0 - 50 U/L    AST 19 0 - 45 U/L   CBC with platelets differential   Result Value Ref Range    WBC 12.0 (H) 4.0 - 11.0 10e9/L    RBC Count 3.80 3.8 - 5.2 10e12/L    Hemoglobin 10.9 (L) 11.7 - 15.7 g/dL    Hematocrit 33.4 (L) 35.0 - 47.0 %    MCV 88 78 - 100 fl    MCH 28.7 26.5 - 33.0 pg    MCHC 32.6 31.5 - 36.5 g/dL    RDW 12.9 10.0 - 15.0 %    Platelet Count 378 150 - 450 10e9/L    Diff Method Automated Method     % Neutrophils 69.5 %    % Lymphocytes 20.6 %    % Monocytes 7.2 %    % Eosinophils 2.2 %    % Basophils 0.5 %       If you have any questions or concerns, please call the clinic at the number listed above.       Sincerely,      Cee Lozano MD/nr

## 2021-02-15 NOTE — LETTER
February 16, 2021      Dano Bowser  [id number]  [facility] [address]        Dear ,    We are writing to inform you of your test results.    Results within acceptable limits.  Normal iron    Resulted Orders   Vitamin B12   Result Value Ref Range    Vitamin B12 1,081 (H) 193 - 986 pg/mL   Iron and iron binding capacity   Result Value Ref Range    Iron 63 35 - 180 ug/dL    Iron Binding Cap 382 240 - 430 ug/dL    Iron Saturation Index 16 15 - 46 %   Albumin Random Urine Quantitative with Creat Ratio   Result Value Ref Range    Creatinine Urine 93 mg/dL    Albumin Urine mg/L 5 mg/L    Albumin Urine mg/g Cr 5.49 0 - 25 mg/g Cr   Hemoglobin A1c   Result Value Ref Range    Hemoglobin A1C 6.3 (H) 0 - 5.6 %      Comment:      Normal <5.7% Prediabetes 5.7-6.4%  Diabetes 6.5% or higher - adopted from ADA   consensus guidelines.     TSH with free T4 reflex   Result Value Ref Range    TSH 0.52 0.40 - 4.00 mU/L   Lipid panel reflex to direct LDL Fasting   Result Value Ref Range    Cholesterol 110 <200 mg/dL    Triglycerides 117 <150 mg/dL      Comment:      Fasting specimen    HDL Cholesterol 53 >49 mg/dL    LDL Cholesterol Calculated 34 <100 mg/dL      Comment:      Desirable:       <100 mg/dl    Non HDL Cholesterol 57 <130 mg/dL   Comprehensive metabolic panel   Result Value Ref Range    Sodium 132 (L) 133 - 144 mmol/L    Potassium 4.7 3.4 - 5.3 mmol/L    Chloride 100 94 - 109 mmol/L    Carbon Dioxide 25 20 - 32 mmol/L    Anion Gap 7 3 - 14 mmol/L    Glucose 133 (H) 70 - 99 mg/dL      Comment:      Fasting specimen    Urea Nitrogen 23 7 - 30 mg/dL    Creatinine 1.18 (H) 0.52 - 1.04 mg/dL    GFR Estimate 43 (L) >60 mL/min/[1.73_m2]      Comment:      Non  GFR Calc  Starting 12/18/2018, serum creatinine based estimated GFR (eGFR) will be   calculated using the Chronic Kidney Disease Epidemiology Collaboration   (CKD-EPI) equation.      GFR Estimate If Black 50 (L) >60 mL/min/[1.73_m2]      Comment:        GFR Calc  Starting 12/18/2018, serum creatinine based estimated GFR (eGFR) will be   calculated using the Chronic Kidney Disease Epidemiology Collaboration   (CKD-EPI) equation.      Calcium 9.6 8.5 - 10.1 mg/dL    Bilirubin Total 0.3 0.2 - 1.3 mg/dL    Albumin 3.2 (L) 3.4 - 5.0 g/dL    Protein Total 7.6 6.8 - 8.8 g/dL    Alkaline Phosphatase 148 40 - 150 U/L    ALT 30 0 - 50 U/L    AST 19 0 - 45 U/L   CBC with platelets differential   Result Value Ref Range    WBC 12.0 (H) 4.0 - 11.0 10e9/L    RBC Count 3.80 3.8 - 5.2 10e12/L    Hemoglobin 10.9 (L) 11.7 - 15.7 g/dL    Hematocrit 33.4 (L) 35.0 - 47.0 %    MCV 88 78 - 100 fl    MCH 28.7 26.5 - 33.0 pg    MCHC 32.6 31.5 - 36.5 g/dL    RDW 12.9 10.0 - 15.0 %    Platelet Count 378 150 - 450 10e9/L    Diff Method Automated Method     % Neutrophils 69.5 %    % Lymphocytes 20.6 %    % Monocytes 7.2 %    % Eosinophils 2.2 %    % Basophils 0.5 %       If you have any questions or concerns, please call the clinic at the number listed above.       Sincerely,      Cee Lozano MD/nr

## 2021-02-15 NOTE — LETTER
February 16, 2021      Dano Bowser  [id number]  [facility] [address]        Dear ,    We are writing to inform you of your test results.    More than adequate B 12     Resulted Orders   Vitamin B12   Result Value Ref Range    Vitamin B12 1,081 (H) 193 - 986 pg/mL   Iron and iron binding capacity   Result Value Ref Range    Iron 63 35 - 180 ug/dL    Iron Binding Cap 382 240 - 430 ug/dL    Iron Saturation Index 16 15 - 46 %   Albumin Random Urine Quantitative with Creat Ratio   Result Value Ref Range    Creatinine Urine 93 mg/dL    Albumin Urine mg/L 5 mg/L    Albumin Urine mg/g Cr 5.49 0 - 25 mg/g Cr   Hemoglobin A1c   Result Value Ref Range    Hemoglobin A1C 6.3 (H) 0 - 5.6 %      Comment:      Normal <5.7% Prediabetes 5.7-6.4%  Diabetes 6.5% or higher - adopted from ADA   consensus guidelines.     TSH with free T4 reflex   Result Value Ref Range    TSH 0.52 0.40 - 4.00 mU/L   Lipid panel reflex to direct LDL Fasting   Result Value Ref Range    Cholesterol 110 <200 mg/dL    Triglycerides 117 <150 mg/dL      Comment:      Fasting specimen    HDL Cholesterol 53 >49 mg/dL    LDL Cholesterol Calculated 34 <100 mg/dL      Comment:      Desirable:       <100 mg/dl    Non HDL Cholesterol 57 <130 mg/dL   Comprehensive metabolic panel   Result Value Ref Range    Sodium 132 (L) 133 - 144 mmol/L    Potassium 4.7 3.4 - 5.3 mmol/L    Chloride 100 94 - 109 mmol/L    Carbon Dioxide 25 20 - 32 mmol/L    Anion Gap 7 3 - 14 mmol/L    Glucose 133 (H) 70 - 99 mg/dL      Comment:      Fasting specimen    Urea Nitrogen 23 7 - 30 mg/dL    Creatinine 1.18 (H) 0.52 - 1.04 mg/dL    GFR Estimate 43 (L) >60 mL/min/[1.73_m2]      Comment:      Non  GFR Calc  Starting 12/18/2018, serum creatinine based estimated GFR (eGFR) will be   calculated using the Chronic Kidney Disease Epidemiology Collaboration   (CKD-EPI) equation.      GFR Estimate If Black 50 (L) >60 mL/min/[1.73_m2]      Comment:       GFR  Calc  Starting 12/18/2018, serum creatinine based estimated GFR (eGFR) will be   calculated using the Chronic Kidney Disease Epidemiology Collaboration   (CKD-EPI) equation.      Calcium 9.6 8.5 - 10.1 mg/dL    Bilirubin Total 0.3 0.2 - 1.3 mg/dL    Albumin 3.2 (L) 3.4 - 5.0 g/dL    Protein Total 7.6 6.8 - 8.8 g/dL    Alkaline Phosphatase 148 40 - 150 U/L    ALT 30 0 - 50 U/L    AST 19 0 - 45 U/L   CBC with platelets differential   Result Value Ref Range    WBC 12.0 (H) 4.0 - 11.0 10e9/L    RBC Count 3.80 3.8 - 5.2 10e12/L    Hemoglobin 10.9 (L) 11.7 - 15.7 g/dL    Hematocrit 33.4 (L) 35.0 - 47.0 %    MCV 88 78 - 100 fl    MCH 28.7 26.5 - 33.0 pg    MCHC 32.6 31.5 - 36.5 g/dL    RDW 12.9 10.0 - 15.0 %    Platelet Count 378 150 - 450 10e9/L    Diff Method Automated Method     % Neutrophils 69.5 %    % Lymphocytes 20.6 %    % Monocytes 7.2 %    % Eosinophils 2.2 %    % Basophils 0.5 %       If you have any questions or concerns, please call the clinic at the number listed above.       Sincerely,      Cee Lozano MD/nr

## 2021-02-16 LAB — DEPRECATED CALCIDIOL+CALCIFEROL SERPL-MC: 49 UG/L (ref 20–75)

## 2021-02-24 ENCOUNTER — TELEPHONE (OUTPATIENT)
Dept: FAMILY MEDICINE | Facility: CLINIC | Age: 81
End: 2021-02-24

## 2021-02-24 ENCOUNTER — OFFICE VISIT (OUTPATIENT)
Dept: FAMILY MEDICINE | Facility: CLINIC | Age: 81
End: 2021-02-24
Payer: COMMERCIAL

## 2021-02-24 VITALS
OXYGEN SATURATION: 95 % | SYSTOLIC BLOOD PRESSURE: 102 MMHG | HEIGHT: 65 IN | WEIGHT: 163 LBS | BODY MASS INDEX: 27.16 KG/M2 | TEMPERATURE: 97.8 F | RESPIRATION RATE: 16 BRPM | HEART RATE: 78 BPM | DIASTOLIC BLOOD PRESSURE: 68 MMHG

## 2021-02-24 DIAGNOSIS — K44.9 HIATAL HERNIA: ICD-10-CM

## 2021-02-24 DIAGNOSIS — H90.5 SENSORINEURAL HEARING LOSS (SNHL), UNSPECIFIED LATERALITY: ICD-10-CM

## 2021-02-24 DIAGNOSIS — Z59.9 FINANCIAL DIFFICULTIES: ICD-10-CM

## 2021-02-24 DIAGNOSIS — I67.9 CEREBROVASCULAR SMALL VESSEL DISEASE: ICD-10-CM

## 2021-02-24 DIAGNOSIS — Z23 NEED FOR DIPHTHERIA-TETANUS-PERTUSSIS (TDAP) VACCINE: ICD-10-CM

## 2021-02-24 DIAGNOSIS — Z00.00 MEDICARE ANNUAL WELLNESS VISIT, SUBSEQUENT: Primary | ICD-10-CM

## 2021-02-24 DIAGNOSIS — N39.0 E-COLI UTI: ICD-10-CM

## 2021-02-24 DIAGNOSIS — N18.30 STAGE 3 CHRONIC KIDNEY DISEASE, UNSPECIFIED WHETHER STAGE 3A OR 3B CKD (H): ICD-10-CM

## 2021-02-24 DIAGNOSIS — E78.5 HYPERLIPIDEMIA, UNSPECIFIED HYPERLIPIDEMIA TYPE: ICD-10-CM

## 2021-02-24 DIAGNOSIS — Z13.820 SCREENING FOR OSTEOPOROSIS: ICD-10-CM

## 2021-02-24 DIAGNOSIS — E04.2 MULTINODULAR GOITER: ICD-10-CM

## 2021-02-24 DIAGNOSIS — D64.9 CHRONIC ANEMIA: ICD-10-CM

## 2021-02-24 DIAGNOSIS — Z23 NEED FOR SHINGLES VACCINE: ICD-10-CM

## 2021-02-24 DIAGNOSIS — Z23 NEED FOR PROPHYLACTIC VACCINATION AND INOCULATION AGAINST INFLUENZA: ICD-10-CM

## 2021-02-24 DIAGNOSIS — B96.89 UTI DUE TO KLEBSIELLA SPECIES: ICD-10-CM

## 2021-02-24 DIAGNOSIS — E11.9 CONTROLLED TYPE 2 DIABETES MELLITUS WITHOUT COMPLICATION, WITHOUT LONG-TERM CURRENT USE OF INSULIN (H): ICD-10-CM

## 2021-02-24 DIAGNOSIS — B96.20 E-COLI UTI: ICD-10-CM

## 2021-02-24 DIAGNOSIS — Z71.89 ADVANCED DIRECTIVES, COUNSELING/DISCUSSION: ICD-10-CM

## 2021-02-24 DIAGNOSIS — Z23 NEED FOR 23-POLYVALENT PNEUMOCOCCAL POLYSACCHARIDE VACCINE: ICD-10-CM

## 2021-02-24 DIAGNOSIS — E87.1 HYPONATREMIA: ICD-10-CM

## 2021-02-24 DIAGNOSIS — L81.0 POST-INFLAMMATORY HYPERPIGMENTATION: ICD-10-CM

## 2021-02-24 DIAGNOSIS — I10 ESSENTIAL HYPERTENSION: ICD-10-CM

## 2021-02-24 DIAGNOSIS — L85.3 DRY SKIN DERMATITIS: ICD-10-CM

## 2021-02-24 DIAGNOSIS — N39.0 UTI DUE TO KLEBSIELLA SPECIES: ICD-10-CM

## 2021-02-24 DIAGNOSIS — I10 ESSENTIAL HYPERTENSION: Primary | ICD-10-CM

## 2021-02-24 DIAGNOSIS — R00.2 PALPITATIONS: ICD-10-CM

## 2021-02-24 DIAGNOSIS — R42 DIZZINESS: ICD-10-CM

## 2021-02-24 DIAGNOSIS — Z86.79 H/O LEFT BUNDLE BRANCH BLOCK: ICD-10-CM

## 2021-02-24 LAB
ANION GAP SERPL CALCULATED.3IONS-SCNC: 5 MMOL/L (ref 3–14)
BASOPHILS NFR BLD AUTO: 0.5 %
BUN SERPL-MCNC: 21 MG/DL (ref 7–30)
CALCIUM SERPL-MCNC: 9.7 MG/DL (ref 8.5–10.1)
CHLORIDE SERPL-SCNC: 100 MMOL/L (ref 94–109)
CO2 SERPL-SCNC: 27 MMOL/L (ref 20–32)
CREAT SERPL-MCNC: 0.79 MG/DL (ref 0.52–1.04)
DIFFERENTIAL METHOD BLD: ABNORMAL
EOSINOPHIL NFR BLD AUTO: 2.6 %
ERYTHROCYTE [DISTWIDTH] IN BLOOD BY AUTOMATED COUNT: 12.8 % (ref 10–15)
GFR SERPL CREATININE-BSD FRML MDRD: 70 ML/MIN/{1.73_M2}
GLUCOSE SERPL-MCNC: 167 MG/DL (ref 70–99)
HCT VFR BLD AUTO: 35.4 % (ref 35–47)
HGB BLD-MCNC: 11.3 G/DL (ref 11.7–15.7)
LYMPHOCYTES NFR BLD AUTO: 19.1 %
MCH RBC QN AUTO: 28.5 PG (ref 26.5–33)
MCHC RBC AUTO-ENTMCNC: 31.9 G/DL (ref 31.5–36.5)
MCV RBC AUTO: 89 FL (ref 78–100)
MONOCYTES NFR BLD AUTO: 4.7 %
NEUTROPHILS NFR BLD AUTO: 73.1 %
PLATELET # BLD AUTO: 378 10E9/L (ref 150–450)
POTASSIUM SERPL-SCNC: 4.3 MMOL/L (ref 3.4–5.3)
RBC # BLD AUTO: 3.97 10E12/L (ref 3.8–5.2)
SODIUM SERPL-SCNC: 132 MMOL/L (ref 133–144)
WBC # BLD AUTO: 11.4 10E9/L (ref 4–11)

## 2021-02-24 PROCEDURE — 90662 IIV NO PRSV INCREASED AG IM: CPT | Performed by: FAMILY MEDICINE

## 2021-02-24 PROCEDURE — 99214 OFFICE O/P EST MOD 30 MIN: CPT | Mod: 25 | Performed by: FAMILY MEDICINE

## 2021-02-24 PROCEDURE — 99207 PR FOOT EXAM NO CHARGE: CPT | Mod: 25 | Performed by: FAMILY MEDICINE

## 2021-02-24 PROCEDURE — 80048 BASIC METABOLIC PNL TOTAL CA: CPT | Performed by: FAMILY MEDICINE

## 2021-02-24 PROCEDURE — 90471 IMMUNIZATION ADMIN: CPT | Performed by: FAMILY MEDICINE

## 2021-02-24 PROCEDURE — 36415 COLL VENOUS BLD VENIPUNCTURE: CPT | Performed by: FAMILY MEDICINE

## 2021-02-24 PROCEDURE — 85025 COMPLETE CBC W/AUTO DIFF WBC: CPT | Performed by: FAMILY MEDICINE

## 2021-02-24 PROCEDURE — 90472 IMMUNIZATION ADMIN EACH ADD: CPT | Performed by: FAMILY MEDICINE

## 2021-02-24 PROCEDURE — 90732 PPSV23 VACC 2 YRS+ SUBQ/IM: CPT | Performed by: FAMILY MEDICINE

## 2021-02-24 PROCEDURE — 99397 PER PM REEVAL EST PAT 65+ YR: CPT | Mod: 25 | Performed by: FAMILY MEDICINE

## 2021-02-24 RX ORDER — ATORVASTATIN CALCIUM 40 MG/1
40 TABLET, FILM COATED ORAL AT BEDTIME
Qty: 90 TABLET | Refills: 1 | Status: SHIPPED | OUTPATIENT
Start: 2021-02-24 | End: 2021-07-16

## 2021-02-24 RX ORDER — MINERAL OIL/HYDROPHIL PETROLAT
OINTMENT (GRAM) TOPICAL PRN
Qty: 60 G | Refills: 0 | Status: SHIPPED | OUTPATIENT
Start: 2021-02-24

## 2021-02-24 RX ORDER — TRIAMCINOLONE ACETONIDE 1 MG/G
OINTMENT TOPICAL 2 TIMES DAILY
Qty: 15 G | Refills: 0 | Status: SHIPPED | OUTPATIENT
Start: 2021-02-24

## 2021-02-24 SDOH — HEALTH STABILITY: MENTAL HEALTH: HOW OFTEN DO YOU HAVE A DRINK CONTAINING ALCOHOL?: NEVER

## 2021-02-24 SDOH — ECONOMIC STABILITY - INCOME SECURITY: PROBLEM RELATED TO HOUSING AND ECONOMIC CIRCUMSTANCES, UNSPECIFIED: Z59.9

## 2021-02-24 ASSESSMENT — MIFFLIN-ST. JEOR: SCORE: 1210.24

## 2021-02-24 NOTE — RESULT ENCOUNTER NOTE
-Hemoglobin is decreased indicating anemia.  Anemia can cause fatigue and, occasionally, light-headedness. Discussed , offered specialist eval, family concerned about cost, desire to monitor  , will recheck in 3 months -White blood cell and platelet counts are normal.

## 2021-02-24 NOTE — LETTER
February 25, 2021      Dano Bowser  [id number]  [facility] [address]        Dear ,    We are writing to inform you of your test results.    -Hemoglobin is decreased indicating anemia.  Anemia can cause fatigue and, occasionally, light-headedness. Discussed , offered specialist rayaal, family concerned about cost, desire to monitor  , will recheck in 3 months -White blood cell and platelet counts are normal.     Resulted Orders   CBC with platelets differential   Result Value Ref Range    WBC 11.4 (H) 4.0 - 11.0 10e9/L    RBC Count 3.97 3.8 - 5.2 10e12/L    Hemoglobin 11.3 (L) 11.7 - 15.7 g/dL    Hematocrit 35.4 35.0 - 47.0 %    MCV 89 78 - 100 fl    MCH 28.5 26.5 - 33.0 pg    MCHC 31.9 31.5 - 36.5 g/dL    RDW 12.8 10.0 - 15.0 %    Platelet Count 378 150 - 450 10e9/L    Diff Method Automated Method     % Neutrophils 73.1 %    % Lymphocytes 19.1 %    % Monocytes 4.7 %    % Eosinophils 2.6 %    % Basophils 0.5 %       If you have any questions or concerns, please call the clinic at the number listed above.       Sincerely,      Cee Lozano MD

## 2021-02-24 NOTE — PATIENT INSTRUCTIONS
Medicare wellness and additional concerns discussed today   Mammogram if desired  Dexa scan due to screen for osteoporosis  Work on advance directives  Make a dilated eye exam yearly   Foot exam done today   Check sugars daily  Due for flu shot recommended yearly   Recommend pneumonia vaccine  Tdap also due   Consider Shingrex  Recommend signing up to my chart and getting covid vaccine  Fall risk precautions  Repeat labs today to check wbc, sodium and kidney function   If sodium remains low consider changing BP med from losartan 50/hctz 12.5 to losartan alone as the diuretic portion is likely contributing to low sodium too  If recheck after that remains low will refer to nephrology  Do not take extra salt or salty drinks as can worsen low sodium as the kidney just excretes it out more and it s not good for the blood pressure to take too much salt/ saline etc  If hemoglobin remains low will refer to hematology  Recommend cardiology evaluation given hx of dizziness falls and abnormal electrical rhythm seen on EKG. No old one to compare , not sure if this is new or just had it long time  Recommend neurology consult given dizziness, falls and mri showed small vessel ischemic disease likely due to dm , htn and aging over time  May cause forgetfulness  Consider PT to help with dizziness if still imbalanced on getting up / change in posture  Continue care with ENT for decreased hearing   Use of incontinent products as needed for urge incontinence  DMV form   Return in 3 months for a recheck or earlier as needed   Further recommendations once labs from today can be reviewed.    We are working hard to begin vaccinating more people against COVID-19. Currently, we are only vaccinating individuals age 75 and older and Phase 1a workers - healthcare workers who are unable to do their job remotely. Vaccine availability is very limited.    If you are 75 or older, or a healthcare worker who is unable to do your job remotely, please  log in to [employer] using this link to see if we have an open appointment and schedule an appointment.  If there are no appointments left, you will be unable to schedule and need to check back later.  If you are a healthcare worker, you will be asked to provide proof of employment at your appointment. If you cannot, you will be turned away.    Vaccine appointments are being added as they become available. Please check your [employer] account frequently for availability. If you have technical difficulty using [employer], call [phone] for assistance.    You can learn more about the [facility]'s phased approach to administering the vaccine, with details on each phase, [email or web address].      As vaccine supply increases and we are able to open appointments to more groups, we will share that information on our website [email or web address]. Check this website to stay up to date on COVID-19 vaccination information.      Patient Education   Personalized Prevention Plan  You are due for the preventive services outlined below.  Your care team is available to assist you in scheduling these services.  If you have already completed any of these items, please share that information with your care team to update in your medical record.  Health Maintenance Due   Topic Date Due     Osteoporosis Screening  1940     Eye Exam  1940     COVID-19 Vaccine (1 of 2) 09/09/1956     Diptheria Tetanus Pertussis (DTAP/TDAP/TD) Vaccine (1 - Tdap) 09/09/1965     Zoster (Shingles) Vaccine (1 of 2) 09/09/1990     FALL RISK ASSESSMENT  09/09/2005      Eat Chicken, lentils, fish and leafy green.    Please call  [phone] to schedule COVID shot.

## 2021-02-24 NOTE — NURSING NOTE
Prior to immunization administration, verified patients identity using patient s name and date of birth. Please see Immunization Activity for additional information.     Screening Questionnaire for Adult Immunization    Are you sick today?   No   Do you have allergies to medications, food, a vaccine component or latex?   No   Have you ever had a serious reaction after receiving a vaccination?   No   Do you have a long-term health problem with heart, lung, kidney, or metabolic disease (e.g., diabetes), asthma, a blood disorder, no spleen, complement component deficiency, a cochlear implant, or a spinal fluid leak?  Are you on long-term aspirin therapy?   No   Do you have cancer, leukemia, HIV/AIDS, or any other immune system problem?   No   Do you have a parent, brother, or sister with an immune system problem?   No   In the past 3 months, have you taken medications that affect  your immune system, such as prednisone, other steroids, or anticancer drugs; drugs for the treatment of rheumatoid arthritis, Crohn s disease, or psoriasis; or have you had radiation treatments?   No   Have you had a seizure, or a brain or other nervous system problem?   No   During the past year, have you received a transfusion of blood or blood    products, or been given immune (gamma) globulin or antiviral drug?   No   For women: Are you pregnant or is there a chance you could become       pregnant during the next month?   No   Have you received any vaccinations in the past 4 weeks?   No     Immunization questionnaire answers were all negative.        Per orders of Dr. Lozano, injection of PPSV23 and Flu given by Arelis Marsh. Patient instructed to remain in clinic for 15 minutes afterwards, and to report any adverse reaction to me immediately.    Due to injection administration, patient instructed to remain in clinic for 15 minutes  afterwards, and to report any adverse reaction to me immediately.         Screening performed by Arelis Marsh on  2/24/2021 at 1:08 PM.

## 2021-02-24 NOTE — LETTER
"[facility] [address] [place] [address]  [phone]          March 8, 2021    Dano Bowser                                                                                                                     [id number]  [facility] [address]            Dear Dano,    We have been trying to get this message to you and your family.  We are mailing you this information.  CHEO Aguilar        losartan 50 sent in   Iron was normal no med to prescribe for anemia   But may take a multivitamin with iron otc daily   Can recheck cbc and iron and hemoglobin when I seen her back in 3 months  Without knowing cause of anemia I cant really know how to treat it   We can only guesstimate given experience and literature that state most common cause of anemia in adults her age are GI losses or kidneys or bone marrow issues , hence the referral to see GI / scopes or hematology   Given kristal to help anemia may mask then an ongoing source of anemia and that could give one a false sense of security      The test strip requested ( TRUE METRIX) was ordered but then got message from the  pharmacy & triage as noted below on 2/8/2021  \"Received call from [facility] on [facility]  Diabetic test strips that were ordered today are not covered by pt's insurance  Pharmacy would like to substitute with One Touch Verio  If okay, then they need new orders orders for all diabetic testing supplies  Meter, lancets, strips  Thank you  CHEO Solis     So I did that and still seems not able to satisfy this dilemma   I think would be one time upfront cost for then future refills on strips to be covered by insurance     So option could be buy the test strips out of pocket as no longer covered by insurance for existing glucometer system they have at home or get a new system they still have to pay for now but then hope is going forward strips to be covered by their insurance     They may have to take it up " with their pharmacy and let us know what to do as I was only following directions by their pharmacy         Sincerely,         Cee Lozano MD/giuseppe

## 2021-02-24 NOTE — PROGRESS NOTES
"  SUBJECTIVE:   Dano Bowser is a 80 year old female who presents for Preventive Visit.    Patient has been advised of split billing requirements and indicates understanding: Yes  Are you in the first 12 months of your Medicare Part B coverage?  No    Physical Health:    In general, how would you rate your overall physical health? good    Outside of work, how many days during the week do you exercise? none    Outside of work, approximately how many minutes a day do you exercise?not applicable    If you drink alcohol do you typically have >3 drinks per day or >7 drinks per week? No    Do you usually eat at least 4 servings of fruit and vegetables a day, include whole grains & fiber and avoid regularly eating high fat or \"junk\" foods? NO    Do you have any problems taking medications regularly?  No    Do you have any side effects from medications? none    Needs assistance for the following daily activities: telephone use, transportation, shopping, preparing meals, housework and taking medicine    Which of the following safety concerns are present in your home?  lack of grab bars in the bathroom     Hearing impairment: Yes, Difficulty following a conversation in a noisy restaurant or crowded room.    Feel that people are mumbling or not speaking clearly.    Need to ask people to speak up or repeat themselves.    Difficulty understanding soft or whispered speech.    Difficulty understanding speech on the telephone.    In the past 6 months, have you been bothered by leaking of urine? No ( but noted last time used incontinent products for urge incontinence )  Mental Health:     In general, how would you rate your overall mental or emotional health? good  PHQ-2 Score:  0  Do you feel safe in your environment? No  Have you ever done Advance Care Planning? (For example, a Health Directive, POLST, or a discussion with a medical provider or your loved ones about your wishes): No, advance care planning information given to " patient to review.  Patient plans to discuss their wishes with loved ones or provider.    Additional concerns to address?  Question about sodium and has skin issue on leg used hydrocortisone cream   Fall risk:  Fallen 2 or more times in the past year?: No  Any fall with injury in the past year?: No  Cognitive Screenin) Repeat 3 items (Leader, Season, Table)    2) Clock draw: unable to draw  3) 3 item recall: Recalls 2 objects   Results: ABNORMAL clock, 1-2 items recalled: PROBABLE COGNITIVE IMPAIRMENT, **INFORM PROVIDER**  Mini-CogTM Copyright JESSICA Benjamin. Licensed by the author for use in [facility]; reprinted with permission ([email or web address]). All rights reserved.    Do you have sleep apnea, excessive snoring or daytime drowsiness?: no    Diabetes Follow-up    How often are you checking your blood sugar? Not at all    What concerns do you have today about your diabetes? None     Do you have any of these symptoms? (Select all that apply)  Numbness in feet and Burning in feet    Have you had a diabetic eye exam in the last 12 months? No    Hyperlipidemia Follow-Up    Are you regularly taking any medication or supplement to lower your cholesterol?   Yes- atorvastatin 40mg    Are you having muscle aches or other side effects that you think could be caused by your cholesterol lowering medication?  No    Hypertension Follow-up    Do you check your blood pressure regularly outside of the clinic? Yes sometimes     Are you following a low salt diet? Yes    Are your blood pressures ever more than 140 on the top number (systolic) OR more   than 90 on the bottom number (diastolic), for example 140/90? No    BACKGROUND  80 yr old [place] lady with hx of DM on metformin( H[facility]1c 7.3 in [facility]), HLD on Lipitor, HTN on losartan-hydrochlorothiazide, LBBB on EKG, resolved dizziness, vertigo, palpitations, hx of fll, ecoli & klebsiella UTI, hx of urge incontinence, small  asymptomatic hiatal hernia noted on CT in 2018 ad asymptomatic diverticulum of colon noted at that time too, noted hx of chronic anemia, hx of lactose intolerance, vit d deficiency on calcium and vit d, hx of intermittent hyponatremia secondary to GE in 2018 and dehydration in 1/2021, hx of a a right chest wall lipoma s/p excision in 2018, with SNHL supposed to be wearing hearing aids under care of ENT / audiology at [facility]ha, rhx of urge incontinence, resolved ceruminous, hx of periodontal disease & tooth extraction of a fractured tooth in nov 2019.  Under care of dentist and hx of allergic conjunctivitis under care of eye, under care of PCP Chelsea Dominguez at [facility] many years, last got labs for her and meds refilled in Jan 2021.    Seen in  first time in [facility] on 2/5 for complain of dizziness vertigo, fatigue, hx of a fall & unsteady gait. Was referred to the ER to evaluate for stroke. [facility]. History limited due to patient's hearing impairment. For the prior 2 days, patient had had dizziness. She stated she got out of bed 2 days  prior and fell. She did not seek medical evaluation at that time. Since that fall, she had had persistent dizziness and  heart pain . She was able to walk but took short steps. No weakness noted in her arms or legs. Had No vision changes. No abdominal pain. No difficulty breathing. No recent fever or chills. No cough. No other concerns. Noted Mildly hypertensive on arrival otherwise normal vitals. There was very limited ability to obtain a full neurologic exam given that the patient was very hard of hearing and non-English speaking. However, ER did not appreciate any focal neurologic deficits. She clarified  she did not have chest pain just dizziness, and when the doctor was examining her felt her heart was beating fast. Urinalysis shows small amount leukocyte esterase and white cells, however contaminated with mucus.  History inconsistent with UTI . Mild leukocytosis, however similar to values obtained last year.  CXR Shallow inspiration. Heart size was normal. Minimal plate like atelectasis at the left lung base. The right lung was clear. No pleural effusions. No pneumothorax. The bones wee demineralized. There was degenerative change in the spine and left shoulder. Sodium(!): 127 Hemoglobin at baseline. Troponin I: 0.03 EKG showed LBBB morphology, sinus rhythm, 2-3mm ST elevation in leads V2-V4, did not meet sgarbossa criteria. No previous EKG for comparison MRI brain showed . No evidence of acute infarction or other acute intracranial abnormality. Findings compatible with moderate small vessel ischemic change. Mild generalized atrophy.   Discussed admission for further workup and management given was still experiencing dizziness, however, patient was adamant that she did not want to stay and wished to leave. Discussed risks and benefits of this decision and they felt she had the capacity to make the decision.     TE done first time 2/8/2021 for hospital discharge follow up and Noted was hydrated with 0.9 % normal saline & Vertigo had resolved, dizziness only when got up suddenly. No longer with palpitations or fatigue. Noted some Urge incontinence & wears diapers. No dysuria, hematuria, frequency, hesitancy, No pelvic complaints. Noted used hydrocortisone for a chronic skin patch  Noted had been asymptomatic for urinary concerns but ER did do a UA/UC and later Culture grew out: >100,000 klebsiella pneumoniae & > 100,000 e coli, noting that Both were pan sensitive EXCEPT the Klebsiella was resistant to nitrofurantoin. There was a cultural and lingual barrier ( speaks Yoruba) and an  was utilized. Didn t hear patient speak at all. Daughter did all the talking on the phone.     Had a telephone visit first time on 2/8/2021 with this provider post ER follow up. Given Bactrim bid 7 days for an Ecoli and klebsiella UTI  noted incidentally when seen in ER for dizziness. Declined seeing neuro, cardio or PT at that time. Noted dizziness had improved and fatigue and palpitations had resolved and gait had improved per daughter who did all the talking on the phone following a hx of fall. In ER noted to have a LBBB with no prior to compare to. Felt hyponatremia seen was due to dehydration. Discussed DM, HTN HLD, multinodular goiter and vit d def briefly & Dm supplies sent in.   Labs done 2/15 showed normal Vit d, n/n anemia stable to prior, wbc slightly elevated with no left shift. Normal micro albumin, b 12, iron, LFT, electrolytes and sodium improved to 132, TSH was normal. Kidney function was mildly decreased to prior with creatinine of 1.18 and GFR of 50 & HBA1c was goal as was lipids.    Here for first time in person visit to this provider and clinic. In for a physical and follow up from last visit.   Language and cultural barrier and used a telephone  which was not ideal. Daughter with did most of the talking.    HERE FOR A Medicare wellness exam and chronic and additional issues discussed today   No breast issues  Declines mammogram due to covid for now  declines dexa scan for now    Doing better, No falls since last seen    DM: not checking, daughter reported they asked for test strips, these were sent in but then pharmacy said not covered by insurance to send in a whole new script for glucometer kit and strips that would be covered by her insurance. This was sent in but daughter is reporting that they were going to be charged for that too and felt was too expensive so it was not picked up and they have not been checking the blood glucose. Is on metformin 850 mg daily. GFR recently mildly decreased but HBA1c was wnl. Has had no lows dn tolerating med fine. Needs a refill.    HTN on losartan 50-hydrochlorothiazide 12.5. has few days on med, ok to check bmp again to decide about diuretic given BP on lower end and  creatinine elevated and sodium trending low though improved. No more vertigo or positional light headiness.    HLD on atorvastatin. Lipids recently goal. Needs refill of med.     Anemia , noted chronic. They seemed unaware. No known bleeding or bruising from anywhere. Hesitant to see hematology due to expense.     CKD 3, mild worsening on arb, ok to recheck. Hesitant to see nephrology due to cost    Hyponatremia, in past has occurred when dehydrated. Also on a diuretic. Recent sodium improved to 132. Ok to recheck today.     Noted small vessel disease on recent MRI, minicog ? Accuracy, language, cultural barrier & very deaf not using her hearing aids today. Dizziness reported resolved. Declines neuro or PT eval currently. Notes generalized weakness, cannot describe, going on sometime , gets around at home holding on to things, walks short distance on her own. Uses daughter as prop t walk. Daughter has no time to take her to PT & currently decline further eval of this. Desires DMV form to be filled for handicap parking. She does not drive but daughter drives her everywhere. Never had disability parking tickets before.     Resolved palpitations, hx of dizziness. LBBB noted on EKG in er recently. No old to compare. Decline cardiology consult due to cost.     Hx of hiatal hernia, denying any reflux, blood in stools or black stools    Multinodular goiter no symptoms, recent TSH wnl    Recent UTI from klebsiella & e coli, completed bactrim, no symptoms. Last visit mentioned urge incontinence. Reporting none currently.     SNHL seen by ENT supposed to be wearing hearing aids. Not seen in ears today    Daughter reports patches of skin itchy colored at times previously hydrocortisone 1 % helped. Does not feel from dry skin.   agreeable to flu and pneumonia shot today   Will consider Tdap and shingles another time  Would be interested in getting the covid shot    BP Readings from Last 2 Encounters:   02/24/21 102/68    02/05/21 (!) 140/72     Hemoglobin A1C (%)   Date Value   02/15/2021 6.3 (H)     LDL Cholesterol Calculated (mg/dL)   Date Value   02/15/2021 34     Reviewed and updated as needed this visit by clinical staff  Tobacco  Allergies  Meds   Med Hx  Surg Hx  Fam Hx  Soc Hx        Reviewed and updated as needed this visit by Provider  Tobacco  Allergies  Meds   Med Hx  Surg Hx  Fam Hx  Soc Hx       Social History     Tobacco Use     Smoking status: Never Smoker     Smokeless tobacco: Never Used   Substance Use Topics     Alcohol use: Never     Frequency: Never                           Current providers sharing in care for this patient include:   Patient Care Team:  Chelsea Dominguez MD as PCP - General    The following health maintenance items are reviewed in Epic and correct as of today:  Health Maintenance   Topic Date Due     DEXA  1940     EYE EXAM  1940     COVID-19 Vaccine (1 of 2) 09/09/1956     DTAP/TDAP/TD IMMUNIZATION (1 - Tdap) 09/09/1965     ZOSTER IMMUNIZATION (1 of 2) 09/09/1990     FALL RISK ASSESSMENT  09/09/2005     A1C  05/15/2021     LIPID  02/15/2022     MICROALBUMIN  02/15/2022     MEDICARE ANNUAL WELLNESS VISIT  02/24/2022     BMP  02/24/2022     DIABETIC FOOT EXAM  02/24/2022     ADVANCE CARE PLANNING  02/24/2026     PHQ-2  Completed     INFLUENZA VACCINE  Completed     Pneumococcal Vaccine: Pediatrics (0 to 5 Years) and At-Risk Patients (6 to 64 Years)  Completed     Pneumococcal Vaccine: 65+ Years  Completed     IPV IMMUNIZATION  Aged Out     MENINGITIS IMMUNIZATION  Aged Out     Lab work is in process  Labs reviewed in EPIC  BP Readings from Last 3 Encounters:   02/24/21 102/68   02/05/21 (!) 140/72    Wt Readings from Last 3 Encounters:   02/24/21 73.9 kg (163 lb)                  Patient Active Problem List   Diagnosis     Controlled type 2 diabetes mellitus without complication, without long-term current use of insulin (H)     Hyponatremia     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     Essential hypertension     Hiatal hernia     Hyperlipidemia     Multinodular goiter     Vitamin D deficiency     Chronic anemia     Stage 3 chronic kidney disease, unspecified whether stage 3a or 3b CKD     H/O left bundle branch block     Sensorineural hearing loss (SNHL), unspecified laterality     Cerebrovascular small vessel disease     Past Surgical History:   Procedure Laterality Date     SURGICAL PATHOLOGY EXAM Right 2018    chest wall       Social History     Tobacco Use     Smoking status: Never Smoker     Smokeless tobacco: Never Used   Substance Use Topics     Alcohol use: Never     Frequency: Never     History reviewed. No pertinent family history.      Current Outpatient Medications   Medication Sig Dispense Refill     atorvastatin (LIPITOR) 40 MG tablet Take 1 tablet (40 mg) by mouth At Bedtime 90 tablet 1     calcium carbonate 600 mg-vitamin D 400 units (CALTRATE) 600-400 MG-UNIT per tablet Take 1 tablet by mouth       cholecalciferol 50 MCG (2000 UT) CAPS TAKE 1 CAPSULE BY MOUTH DAILY.       losartan-hydrochlorothiazide (HYZAAR) 50-12.5 MG tablet Take 1 tablet by mouth       metFORMIN (GLUCOPHAGE) 850 MG tablet Take 1 tablet (850 mg) by mouth daily (with dinner) 90 tablet 0     mineral oil-hydrophilic petrolatum (AQUAPHOR) external ointment Apply topically as needed Apply to skin neck to feet daily after a shower 60 g 0     thin (NO BRAND SPECIFIED) lancets Use with lanceting device. To accompany: Blood Glucose Monitor Brands: per insurance. 100 each 6     triamcinolone (KENALOG) 0.1 % external ointment Apply topically 2 times daily To flared up itchy skin 2 weeks sparingly as can thin skin 15 g 0     alcohol swab prep pads Use to swab area of injection/yue as directed. (Patient not taking: Reported on 2/24/2021) 100 each 3     blood glucose (NO BRAND SPECIFIED) test strip Use to test blood sugar 1 times daily or as directed. To accompany: Blood Glucose Monitor Brands: per  "insurance. (Patient not taking: Reported on 2/24/2021) 100 strip 6     blood glucose calibration (NO BRAND SPECIFIED) solution To accompany: Blood Glucose Monitor Brands: per insurance. (Patient not taking: Reported on 2/24/2021) 1 Bottle 3     blood glucose monitoring (NO BRAND SPECIFIED) meter device kit Use to test blood sugar 1 times daily or as directed. Preferred blood glucose meter OR supplies to accompany: Blood Glucose Monitor Brands: per insurance. (Patient not taking: Reported on 2/24/2021) 1 kit 0     vitamin  s/Minerals TABS Take 1 tablet by mouth       Allergies   Allergen Reactions     Lactose Diarrhea     Recent Labs   Lab Test 02/24/21  1140 02/15/21  1135   A1C  --  6.3*   LDL  --  34   HDL  --  53   TRIG  --  117   ALT  --  30   CR 0.79 1.18*   GFRESTIMATED 70 43*   GFRESTBLACK 81 50*   POTASSIUM 4.3 4.7   TSH  --  0.52      ROS:  Constitutional, HEENT, cardiovascular, pulmonary, GI, , musculoskeletal, neuro, skin, endocrine and psych systems are negative, except as otherwise noted.    OBJECTIVE:   /68 (BP Location: Right arm, Patient Position: Sitting, Cuff Size: Adult Regular)   Pulse 78   Temp 97.8  F (36.6  C) (Oral)   Resp 16   Ht 1.651 m (5' 5\")   Wt 73.9 kg (163 lb)   SpO2 95%   BMI 27.12 kg/m   Estimated body mass index is 27.12 kg/m  as calculated from the following:    Height as of this encounter: 1.651 m (5' 5\").    Weight as of this encounter: 73.9 kg (163 lb).  EXAM:   GENERAL: healthy, alert, no distress and overweight  EYES: Eyes grossly normal to inspection, PERRL and conjunctivae and sclerae normal  HENT: ear canals and TM's normal, nose and mouth without ulcers or lesions, hard of hearing ,   NECK: no adenopathy, no asymmetry, masses, or scars and thyroid normal to palpation  RESP: lungs clear to auscultation - no rales, rhonchi or wheezes  CV: regular rate and rhythm, normal S1 S2, no S3 or S4, no murmur, click or rub, no peripheral edema and peripheral pulses " strong  ABDOMEN: soft, non tender, no hepatosplenomegaly, no masses and bowel sounds normal  MS: no gross musculoskeletal defects noted, no edema, able to get up and stand and walk few steps on her own and with assistance. Able to walk on her own slowly. Seen walking clinic to lab area and back  SKIN: no suspicious lesions or rashes, has dry scaly skin, areas of dry patches that don't look active and areas of post inflammatory hyperpigmentation  NEURO: difficult to assess as hard of hearing, language and cultural barrier and daughter does most of the talking, Appeared to doze off while sitting in the clinic while I was speaking with her daughter   PSYCH: concentration poor, inattentive, affect normal/bright and very difficult to assess as hardly speaks   DM foot exam, normal pulses, and monofilament test, no ulcers noted  Diagnostic Test Results:  Labs reviewed in Epic    ASSESSMENT / PLAN:       ICD-10-CM    1. Medicare annual wellness visit, subsequent  Z00.00 FLU VACCINE, INCREASED ANTIGEN, PRESV FREE     Pneumococcal vaccine 23 valent PPSV23  (Pneumovax) [78221]     ADMIN MEDICARE: Pneumococcal Vaccine ()   2. Controlled type 2 diabetes mellitus without complication, without long-term current use of insulin (H)  E11.9 FOOT EXAM     metFORMIN (GLUCOPHAGE) 850 MG tablet   3. Essential hypertension  I10 Basic metabolic panel   4. Hyperlipidemia, unspecified hyperlipidemia type  E78.5 atorvastatin (LIPITOR) 40 MG tablet   5. Chronic anemia  D64.9 CBC with platelets differential   6. Stage 3 chronic kidney disease, unspecified whether stage 3a or 3b CKD  N18.30 Basic metabolic panel   7. Hyponatremia  E87.1 Basic metabolic panel   8. Cerebrovascular small vessel disease  I67.9    9. H/O left bundle branch block  Z86.79    10. Dizziness  R42 CBC with platelets differential   11. Palpitations  R00.2 CBC with platelets differential   12. Hiatal hernia  K44.9 CBC with platelets differential   13. Multinodular goiter   E04.2    14. UTI due to Klebsiella species  N39.0 CBC with platelets differential    B96.89    15. E-coli UTI  N39.0 CBC with platelets differential    B96.20    16. Sensorineural hearing loss (SNHL), unspecified laterality  H90.5    17. Dry skin dermatitis  L85.3 triamcinolone (KENALOG) 0.1 % external ointment     mineral oil-hydrophilic petrolatum (AQUAPHOR) external ointment   18. Post-inflammatory hyperpigmentation  L81.0 mineral oil-hydrophilic petrolatum (AQUAPHOR) external ointment   19. Financial difficulties  Z59.8 CARE COORDINATION REFERRAL   20. Screening for osteoporosis  Z13.820    21. Advanced directives, counseling/discussion  Z71.89    22. Need for prophylactic vaccination and inoculation against influenza  Z23 FLU VACCINE, INCREASED ANTIGEN, PRESV FREE   23. Need for 23-polyvalent pneumococcal polysaccharide vaccine  Z23 Pneumococcal vaccine 23 valent PPSV23  (Pneumovax) [[id number]]     ADMIN MEDICARE: Pneumococcal Vaccine ()   24. Need for diphtheria-tetanus-pertussis (Tdap) vaccine  Z23    25. Need for shingles vaccine  Z23      Seen today for a Medicare wellness exam, establish care, chronic and additional new issues discussed today   No breast issues  Declines mammogram due to covid for now  Declines dexa scan for now  Work on advance directives  Make a dilated eye exam yearly   Foot exam done today   Check sugars daily  Due for flu shot recommended yearly   Recommend pneumonia vaccine  Tdap also due   Consider Shingrex  Recommend signing up to my chart and getting covid vaccine  Fall risk precautions  Doing better, No falls since spoke on the phone.    DM: not checking, daughter reported they asked for test strips, these were sent in but then pharmacy said not covered by insurance to send in a whole new script for glucometer kit and strips that would be covered by her insurance. This was sent in but daughter is reporting that they were going to be charged for that too and felt was too  expensive so it was not picked up and they have not been checking the blood glucose. Is on metformin 850 mg daily. GFR recently mildly decreased but HBA1c was wnl. Has had no lows and tolerating med fine. Refill sent n & care coordinator referral made given cost issues.    HTN on losartan 50-hydrochlorothiazide 12.5. has few days on med, ok to check bmp again to decide about diuretic given BP on lower end and creatinine elevated and sodium trending low though improved. No more vertigo or positional light headiness.Repeat labs today to check wbc, sodium and kidney function. If sodium remains low consider changing BP med from losartan 50/hctz 12.5 to losartan alone as the diuretic portion is likely contributing to low sodium too. If recheck after that remains low will refer to nephrology. Advised to not take extra salt or salty drinks as can worsen low sodium as the kidney just excretes it out more and it s not good for the blood pressure to take too much salt/ saline etc. Later creatinine came back improved since off bactrim but sodium remains low at 132 and given low BP will call to change med to losartan 50 mg alone and have them check BP at home and call if going more than 140 /90 and then could increase losartan up if needed     HLD on atorvastatin. Lipids recently goal. Refills sent.     Anemia , noted not new but seemed unaware. No known bleeding or bruising from anywhere. Hesitant to see hematology due to expense. Anemia could have accounted for dizziness. Currently stable. Hx of hiatal hernia, denying any reflux, blood in stools or black stools. Increase iron rich foods. Recent iron ady. Will check ferritin next time. Could be anemia of chronic disease related to CKD3.     CKD 3, mild worsening on arb, ok to recheck. Hesitant to see nephrology due to cost    Hyponatremia, in past has occurred when dehydrated. Also on a diuretic. Recent sodium improved to 132. Ok to recheck today.     Noted small vessel  disease on recent MRI, minicog ? Accuracy, language, cultural barrier & very deaf not using her hearing aids today. Dizziness reported resolved. Recommended neurology consult given dizziness, falls and mri showed small vessel ischemic disease likely due to dm , HTN and aging over time. Also May cause forgetfulness. Can consider PT to help with dizziness if still imbalanced on getting up / change in posture. Declines neuro or PT eval currently. Notes generalized weakness, cannot describe, going on sometime , gets around at home holding on to things, walks short distance on her own. Uses daughter as prop to walk. Daughter has no time to take her to PT & currently decline further eval of this. Able to walk on own from room to lab & back. Desires DMV form to be filled for handicap parking. She does not drive but daughter drives her everywhere. Never had disability parking tickets before. These were filled, copy kept for epic. Original given to daughter by MA.    Resolved palpitations, hx of dizziness. LBBB noted on EKG in er recently. No old to compare. Decline cardiology consult due to cost.     Hx of hiatal hernia, denying any reflux, blood in stools or black stools    Multinodular goiter no symptoms, recent TSH wnl    Recent UTI from klebsiella & e coli, completed bactrim, no symptoms. Last visit mentioned urge incontinence. Reporting none currently.     SNHL seen by ENT supposed to be wearing hearing aids. Not seen in ears today. Continue care with ENT for decreased hearing     Appears to have dry skin dermatitis & patches of post inflammatory hyperpigmentation. Advised avoiding long hot showers. Try Aquaphor to skin daily after a shower & tac cream only to itchy flared areas twice daily for 2 weeks prn & not to use much as could thin skin & cause hypopigmentation. Advised no med was necessary for post inflammatory hyperpigmented areas as they would resolve on their own with time.     Agreeable to flu and pneumonia  "shot today & given today  Will consider Tdap and shingles another time  Would be interested in getting the covid shot.  I called number and only location was [place] this afternoon and [place] next Tuesday. Daughter felt to far and declined. So then given a number and advised to call next Tuesday when more open slots were expected to appear. Given ok to get flu and pneumonia shot today and told by  it wouldn't affect them getting the covid vaccine.     Return in 3 months ( cbc, bmp, ferritin, HBA1c etc) for a recheck or earlier as needed. Further recommendations once labs from today can be reviewed.    Patient has been advised of split billing requirements and indicates understanding: Yes    COUNSELING:  Reviewed preventive health counseling, as reflected in patient instructions       Regular exercise       Healthy diet/nutrition       Vision screening       Hearing screening       Dental care       Bladder control       Fall risk prevention       Immunizations    Vaccinated for: Influenza and Pneumococcal         Osteoporosis prevention/bone health       The ASCVD Risk score (Nabbsony CHAPMAN Jr., et al., 2013) failed to calculate for the following reasons:    The 2013 ASCVD risk score is only valid for ages 40 to 79       Advanced Planning     Estimated body mass index is 27.12 kg/m  as calculated from the following:    Height as of this encounter: 1.651 m (5' 5\").    Weight as of this encounter: 73.9 kg (163 lb).    Weight management plan: Discussed healthy diet and exercise guidelines    She reports that she has never smoked. She has never used smokeless tobacco.    Appropriate preventive services were discussed with this patient, including applicable screening as appropriate for cardiovascular disease, diabetes, osteopenia/osteoporosis, and glaucoma.  As appropriate for age/gender, discussed screening for colorectal cancer, prostate cancer, breast cancer, and cervical cancer. Checklist reviewing preventive " services available has been given to the patient.    Reviewed patients plan of care and provided an AVS. The Complex Care Plan (for patients with higher acuity and needing more deliberate coordination of services) for Dano meets the Care Plan requirement. This Care Plan has been established and reviewed with the Patient and daughter.    Counseling Resources:  ATP IV Guidelines  Pooled Cohorts Equation Calculator  Breast Cancer Risk Calculator  BRCA-Related Cancer Risk Assessment: FHS-7 Tool  FRAX Risk Assessment  ICSI Preventive Guidelines  Dietary Guidelines for Americans, 2010  USDA's MyPlate  ASA Prophylaxis  Lung CA Screening    Cee Lozano MD  [facility]

## 2021-02-25 ENCOUNTER — PATIENT OUTREACH (OUTPATIENT)
Dept: CARE COORDINATION | Facility: CLINIC | Age: 81
End: 2021-02-25

## 2021-02-25 ENCOUNTER — APPOINTMENT (OUTPATIENT)
Dept: INTERPRETER SERVICES | Facility: CLINIC | Age: 81
End: 2021-02-25
Payer: COMMERCIAL

## 2021-02-25 RX ORDER — LOSARTAN POTASSIUM 50 MG/1
50 TABLET ORAL DAILY
Qty: 90 TABLET | Refills: 0 | Status: SHIPPED | OUTPATIENT
Start: 2021-02-25 | End: 2021-05-07

## 2021-02-25 NOTE — PROGRESS NOTES
Clinic Care Coordination Contact  [facility]/Voicemail       Clinical Data: Care Coordinator Outreach  Outreach attempted x 1.  Left message on patient's voicemail with call back information and requested return call.  Plan:Care Coordinator will try to reach patient again in 1-2 business days.

## 2021-02-25 NOTE — TELEPHONE ENCOUNTER
Pt's daughter called using [employer] . I gave her information. She is fine with changing BP medication. Losartan 50 mg pended with pharmacy. Daughter wanting to know if a medication can be prescribed for anemia without any further testing or intervention? Also diabetic supplies were covered but they were charged 56$ because an entire new system was ordered and she only needed the test strips.  Kalli Elizabeth RN

## 2021-02-25 NOTE — LETTER
[facility] CARE COORDINATION  [facility]  [address], [place] [address]  February 26, 2021    Dano Bowser  [id number]  [facility] [address]      Dear Dano,    I am a clinic community health worker who works with Chelsea Dominguez MD at [facility]. I have been trying to reach you recently to introduce Clinic Care Coordination and to see if there was anything I could assist you with.  Below is a description of clinic care coordination and how I can further assist you.      The clinic care coordination team is made up of a registered nurse,  and community health worker who understand the health care system. The goal of clinic care coordination is to help you manage your health and improve access to the health care system in the most efficient manner. The team can assist you in meeting your health care goals by providing education, coordinating services, strengthening the communication among your providers and supporting you with any resource needs.    Please feel free to contact me at [phone] with any questions or concerns. We are focused on providing you with the highest-quality healthcare experience possible and that all starts with you.     Sincerely,     ARABELLA Vale

## 2021-02-25 NOTE — TELEPHONE ENCOUNTER
Writer called patient's daughter, Margy, with Vietnamese , Yohana:  Left message to call back and ask to speak with an available triage nurse.    BOB CmN, RN  [facility]

## 2021-02-25 NOTE — TELEPHONE ENCOUNTER
"losartan 50 sent in   Iron was normal no med to prescribe for anemia   But may take a multivitamin with iron otc daily   Can recheck cbc and iron and hemoglobin when I seen her back in 3 months  Without knowing cause of anemia I cant really know how to treat it   We can only guesstimate given experience and literature that state most common cause of anemia in adults her age are GI losses or kidneys or bone marrow issues , hence the referral to see GI / scopes or hematology   Given kristal to help anemia may mask then an ongoing source of anemia and that could give one a false sense of security     The test strip requested ( TRUE METRIX) was ordered but then got message from the  pharmacy & triage as noted below on 2/8/2021  \"Received call from [facility] on [facility]  Diabetic test strips that were ordered today are not covered by pt's insurance  Pharmacy would like to substitute with One Touch Verio  If okay, then they need new orders orders for all diabetic testing supplies  Meter, lancets, strips  Thank you  Will Hastings RN on 2/8/2021 at 5:25 PM\"    So I did that and still seems not able to satisfy this dilemma   I think would be one time upfront cost for then future refills on strips to be covered by insurance    So option could be buy the test strips out of pocket as no longer covered by insurance for existing glucometer system they have at home or get a new system they still have to pay for now but then hope is going forward strips to be covered by their insurance    They may have to take it up with their pharmacy and let us know what to do as I was only following directions by their pharmacy       "

## 2021-02-25 NOTE — TELEPHONE ENCOUNTER
Writer blanca Ji with Hebrew , #[id number]:  Left message to call back and ask to speak with an available triage nurse.  BOB CmN, RN  [facility]

## 2021-02-26 ENCOUNTER — APPOINTMENT (OUTPATIENT)
Dept: INTERPRETER SERVICES | Facility: CLINIC | Age: 81
End: 2021-02-26
Payer: COMMERCIAL

## 2021-02-26 NOTE — PROGRESS NOTES
Clinic Care Coordination Contact  [facility]/Voicemail       Clinical Data: Care Coordinator Outreach  Outreach attempted x 2.  Left message on patient's voicemail with call back information and requested return call.  Plan: Care Coordinator will send unable to contact letter with care coordinator contact information via mail. Care Coordinator will do no further outreaches at this time.

## 2021-03-01 NOTE — TELEPHONE ENCOUNTER
Polish  called #[phone]. LM to [facility]. See below note from Dr Lozano. Kalli Elizabeth RN

## 2021-03-09 NOTE — TELEPHONE ENCOUNTER
Connection has not been made with pt by RN from clinic.   Care coordinator did not make connection on 2/26/21 and closed encounter.    Pharmacy was called to see if pt picked up the losartan rx'd 2/25/21. She did  on 2/28/21.  Pt does have 3/17/21 appt with Dr Licona, cardiology    Called with  [facility] #[id number]    Attempted to reach, unable. Left message  to call back.     (daughter and pt's number is the same)    Dr Lozano is aware of our problem connecting again with pt but good she is f/u with cardiology and if daughter calls back info can be relayed to her. When they are ready Dr Lozano will put in a referral to hematology    Ximena Walter RN

## 2021-03-11 NOTE — TELEPHONE ENCOUNTER
im closing this chart as open since 2/24. Not sure if any letter sent as requested. Can discuss also when & if  family  call back

## 2021-03-17 ENCOUNTER — OFFICE VISIT (OUTPATIENT)
Dept: CARDIOLOGY | Facility: CLINIC | Age: 81
End: 2021-03-17
Attending: FAMILY MEDICINE
Payer: COMMERCIAL

## 2021-03-17 VITALS
OXYGEN SATURATION: 96 % | SYSTOLIC BLOOD PRESSURE: 121 MMHG | BODY MASS INDEX: 27.62 KG/M2 | WEIGHT: 166 LBS | HEART RATE: 94 BPM | DIASTOLIC BLOOD PRESSURE: 78 MMHG

## 2021-03-17 DIAGNOSIS — R00.2 PALPITATIONS: ICD-10-CM

## 2021-03-17 DIAGNOSIS — R42 DIZZINESS: ICD-10-CM

## 2021-03-17 DIAGNOSIS — Z86.79 H/O LEFT BUNDLE BRANCH BLOCK: ICD-10-CM

## 2021-03-17 PROCEDURE — 93000 ELECTROCARDIOGRAM COMPLETE: CPT | Performed by: INTERNAL MEDICINE

## 2021-03-17 PROCEDURE — 99204 OFFICE O/P NEW MOD 45 MIN: CPT | Performed by: INTERNAL MEDICINE

## 2021-03-17 NOTE — PROGRESS NOTES
Service Date: 03/17/2021      CLINIC NOTE      HISTORY OF PRESENT ILLNESS:  I saw Ms. Bowser for evaluation of palpitation and dizziness.  She is an 80-year-old black female who speaks Kiswahili only.  She came to the clinic with her daughter who does not speak English either.  The clinic visit was conducted through a phone  today.  Unfortunately, the communication was quite difficult because the  was mostly talking to the daughter, with only partial translation for me, and I do not understand their language.  From the record review, she was carrying the diagnosis of type 2 diabetes, hypertension.  The daughter reported that the patient has been having low blood pressure but I did not have a record of hypotension from record review.  The patient has reported episodes of dizziness and palpitation.  The 2 were not closely associated.  There was no shortness of breath, chest pain.  There is no history of syncope or near syncope.      EKG from 03/17 showed sinus rhythm with a left bundle branch block.  The blood test from 02/24 showed mild anemia with hemoglobin 11.3.  Her blood glucose is at 167.  Creatinine is normal.      PHYSICAL EXAMINATION:   VITAL SIGNS:  Blood pressure was 121/78, heart rate 94 beats per minute, body weight 166 pounds.   GENERAL:  She came to the clinic in a wheelchair.   HEENT:  The eyes and ENT were unremarkable.   LUNGS:  Clear.   CARDIAC:  Rhythm was regular and heart sounds were normal without murmur.   ABDOMEN:  Examination showed no obesity.   EXTREMITIES:  There was no pedal edema.      ASSESSMENT AND RECOMMENDATIONS:  Ms. Bowser is an 80-year-old black female who speaks Kiswahili only.  She is complaining of dizziness and palpitation occasionally.      The EKG showed left bundle branch block.  For further evaluation, I have ordered an echocardiography and a Zio Patch monitor.  I will see her for followup  in about 2 months.  For the time being, she will continue the same  medications.       The daughter stated that she does not have a family physician.  We will try to help her to get a family doctor who can manage her diabetes and hypertension.      cc:   Dr. Chelsea SZYMANSKI MD             D: 2021   T: 2021   MT: FRANNY      Name:     SOL GAUTHIER   MRN:      [id number]        Account:      [id number]   :      1940           Service Date: 2021      Document: [id number]

## 2021-03-17 NOTE — LETTER
3/17/2021    Chelsea Dominguez MD  [facility] [address]   [place] [facility] [address]    RE: Dano Bowser       Dear Colleague,    I had the pleasure of seeing Dano Bowser in the [facility].    HPI and Plan:   See dictation    Orders Placed This Encounter   Procedures     Follow-Up with Electrophysiologist     EKG 12-lead complete w/read - Clinics (performed today)     Leadless EKG Monitor 8 to 14 Days     Echocardiogram Complete       No orders of the defined types were placed in this encounter.      There are no discontinued medications.      Encounter Diagnoses   Name Primary?     Palpitations      Dizziness      H/O left bundle branch block        CURRENT MEDICATIONS:  Current Outpatient Medications   Medication Sig Dispense Refill     atorvastatin (LIPITOR) 40 MG tablet Take 1 tablet (40 mg) by mouth At Bedtime 90 tablet 1     calcium carbonate 600 mg-vitamin D 400 units (CALTRATE) 600-400 MG-UNIT per tablet Take 1 tablet by mouth       cholecalciferol 50 MCG (2000 UT) CAPS TAKE 1 CAPSULE BY MOUTH DAILY.       losartan (COZAAR) 50 MG tablet Take 1 tablet (50 mg) by mouth daily (Patient taking differently: Take 50 mg by mouth daily Taking 40 mg) 90 tablet 0     metFORMIN (GLUCOPHAGE) 850 MG tablet Take 1 tablet (850 mg) by mouth daily (with dinner) 90 tablet 0     alcohol swab prep pads Use to swab area of injection/yue as directed. (Patient not taking: Reported on 2/24/2021) 100 each 3     blood glucose (NO BRAND SPECIFIED) test strip Use to test blood sugar 1 times daily or as directed. To accompany: Blood Glucose Monitor Brands: per insurance. (Patient not taking: Reported on 2/24/2021) 100 strip 6     blood glucose calibration (NO BRAND SPECIFIED) solution To accompany: Blood Glucose Monitor Brands: per insurance. (Patient not taking: Reported on 2/24/2021) 1 Bottle 3     blood glucose monitoring (NO BRAND SPECIFIED) meter device  kit Use to test blood sugar 1 times daily or as directed. Preferred blood glucose meter OR supplies to accompany: Blood Glucose Monitor Brands: per insurance. (Patient not taking: Reported on 2/24/2021) 1 kit 0     mineral oil-hydrophilic petrolatum (AQUAPHOR) external ointment Apply topically as needed Apply to skin neck to feet daily after a shower 60 g 0     thin (NO BRAND SPECIFIED) lancets Use with lanceting device. To accompany: Blood Glucose Monitor Brands: per insurance. 100 each 6     triamcinolone (KENALOG) 0.1 % external ointment Apply topically 2 times daily To flared up itchy skin 2 weeks sparingly as can thin skin 15 g 0     vitamin  s/Minerals TABS Take 1 tablet by mouth         ALLERGIES     Allergies   Allergen Reactions     Lactose Diarrhea       PAST MEDICAL HISTORY:  Past Medical History:   Diagnosis Date     Acute hyponatremia 6/30/2018    Assoc c acute gastroenteritis 2018 june     Benign essential hypertension      Diabetes mellitus (H)      High cholesterol      Mass of chest wall, right 6/30/2018    Lipoma? Not seen on ct of 6 18, s/p removal by surgeon        PAST SURGICAL HISTORY:  Past Surgical History:   Procedure Laterality Date     SURGICAL PATHOLOGY EXAM Right 2018    chest wall       FAMILY HISTORY:  History reviewed. No pertinent family history.    SOCIAL HISTORY:  Social History     Socioeconomic History     Marital status:      Spouse name: None     Number of children: None     Years of education: None     Highest education level: None   Occupational History     None   Social Needs     Financial resource strain: None     Food insecurity     Worry: None     Inability: None     Transportation needs     Medical: None     Non-medical: None   Tobacco Use     Smoking status: Never Smoker     Smokeless tobacco: Never Used   Substance and Sexual Activity     Alcohol use: Never     Frequency: Never     Drug use: Never     Sexual activity: Yes     Partners: Male   Lifestyle      Physical activity     Days per week: None     Minutes per session: None     Stress: None   Relationships     Social connections     Talks on phone: None     Gets together: None     Attends Voodoo service: None     Active member of club or organization: None     Attends meetings of clubs or organizations: None     Relationship status: None     Intimate partner violence     Fear of current or ex partner: None     Emotionally abused: None     Physically abused: None     Forced sexual activity: None   Other Topics Concern     None   Social History Narrative     None       Review of Systems:  Skin:  Negative       Eyes:  Negative      ENT:  Negative      Respiratory:  Positive for wheezing     Cardiovascular:    Positive for;palpitations;dizziness    Gastroenterology: Negative      Genitourinary:  Negative      Musculoskeletal:  Negative      Neurologic:  Negative      Psychiatric:  Negative      Heme/Lymph/Imm:  Negative      Endocrine:  Positive for diabetes      Physical Exam:  Vitals: /78 (BP Location: Left arm, Patient Position: Sitting, Cuff Size: Adult Large)   Pulse 94   Wt 75.3 kg (166 lb)   SpO2 96%   BMI 27.62 kg/m      Constitutional:  cooperative, alert and oriented, well developed, well nourished, in no acute distress        Skin:  warm and dry to the touch, no apparent skin lesions or masses noted          Head:  normocephalic, no masses or lesions        Eyes:  pupils equal and round, conjunctivae and lids unremarkable, sclera white, no xanthalasma, EOMS intact, no nystagmus        Lymph:No Cervical lymphadenopathy present     ENT:  no pallor or cyanosis, dentition good        Neck:  carotid pulses are full and equal bilaterally, JVP normal, no carotid bruit        Respiratory:  normal breath sounds, clear to auscultation, normal A-P diameter, normal symmetry, normal respiratory excursion, no use of accessory muscles         Cardiac: regular rhythm, normal S1/S2, no S3 or S4, apical impulse  not displaced, no murmurs, gallops or rubs                                                         GI:  abdomen soft, non-tender, BS normoactive, no mass, no HSM, no bruits        Extremities and Muscular Skeletal:  no deformities, clubbing, cyanosis, erythema observed              Neurological:  no gross motor deficits        Psych:  Alert and Oriented x 3      Thank you for allowing me to participate in the care of your patient.      Sincerely,     Anny Licona MD     [facility]    cc:   Cee Lozano MD  [place] [phone]

## 2021-03-17 NOTE — PROGRESS NOTES
HPI and Plan:   See dictation    Orders Placed This Encounter   Procedures     Follow-Up with Electrophysiologist     EKG 12-lead complete w/read - Clinics (performed today)     Leadless EKG Monitor 8 to 14 Days     Echocardiogram Complete       No orders of the defined types were placed in this encounter.      There are no discontinued medications.      Encounter Diagnoses   Name Primary?     Palpitations      Dizziness      H/O left bundle branch block        CURRENT MEDICATIONS:  Current Outpatient Medications   Medication Sig Dispense Refill     atorvastatin (LIPITOR) 40 MG tablet Take 1 tablet (40 mg) by mouth At Bedtime 90 tablet 1     calcium carbonate 600 mg-vitamin D 400 units (CALTRATE) 600-400 MG-UNIT per tablet Take 1 tablet by mouth       cholecalciferol 50 MCG (2000 UT) CAPS TAKE 1 CAPSULE BY MOUTH DAILY.       losartan (COZAAR) 50 MG tablet Take 1 tablet (50 mg) by mouth daily (Patient taking differently: Take 50 mg by mouth daily Taking 40 mg) 90 tablet 0     metFORMIN (GLUCOPHAGE) 850 MG tablet Take 1 tablet (850 mg) by mouth daily (with dinner) 90 tablet 0     alcohol swab prep pads Use to swab area of injection/yue as directed. (Patient not taking: Reported on 2/24/2021) 100 each 3     blood glucose (NO BRAND SPECIFIED) test strip Use to test blood sugar 1 times daily or as directed. To accompany: Blood Glucose Monitor Brands: per insurance. (Patient not taking: Reported on 2/24/2021) 100 strip 6     blood glucose calibration (NO BRAND SPECIFIED) solution To accompany: Blood Glucose Monitor Brands: per insurance. (Patient not taking: Reported on 2/24/2021) 1 Bottle 3     blood glucose monitoring (NO BRAND SPECIFIED) meter device kit Use to test blood sugar 1 times daily or as directed. Preferred blood glucose meter OR supplies to accompany: Blood Glucose Monitor Brands: per insurance. (Patient not taking: Reported on 2/24/2021) 1 kit 0     mineral oil-hydrophilic petrolatum (AQUAPHOR) external  ointment Apply topically as needed Apply to skin neck to feet daily after a shower 60 g 0     thin (NO BRAND SPECIFIED) lancets Use with lanceting device. To accompany: Blood Glucose Monitor Brands: per insurance. 100 each 6     triamcinolone (KENALOG) 0.1 % external ointment Apply topically 2 times daily To flared up itchy skin 2 weeks sparingly as can thin skin 15 g 0     vitamin  s/Minerals TABS Take 1 tablet by mouth         ALLERGIES     Allergies   Allergen Reactions     Lactose Diarrhea       PAST MEDICAL HISTORY:  Past Medical History:   Diagnosis Date     Acute hyponatremia 6/30/2018    [facility] acute gastroenteritis 2018 june     Benign essential hypertension      Diabetes mellitus (H)      High cholesterol      Mass of chest wall, right 6/30/2018    Lipoma? Not seen on ct of 6 18, s/p removal by surgeon        PAST SURGICAL HISTORY:  Past Surgical History:   Procedure Laterality Date     SURGICAL PATHOLOGY EXAM Right 2018    chest wall       FAMILY HISTORY:  History reviewed. No pertinent family history.    SOCIAL HISTORY:  Social History     Socioeconomic History     Marital status:      Spouse name: None     Number of children: None     Years of education: None     Highest education level: None   Occupational History     None   Social Needs     Financial resource strain: None     Food insecurity     Worry: None     Inability: None     Transportation needs     Medical: None     Non-medical: None   Tobacco Use     Smoking status: Never Smoker     Smokeless tobacco: Never Used   Substance and Sexual Activity     Alcohol use: Never     Frequency: Never     Drug use: Never     Sexual activity: Yes     Partners: Male   Lifestyle     Physical activity     Days per week: None     Minutes per session: None     Stress: None   Relationships     Social connections     Talks on phone: None     Gets together: None     Attends [employer] service: None     Active member of club or organization: None     Attends  meetings of clubs or organizations: None     Relationship status: None     Intimate partner violence     Fear of current or ex partner: None     Emotionally abused: None     Physically abused: None     Forced sexual activity: None   Other Topics Concern     None   Social History Narrative     None       Review of Systems:  Skin:  Negative       Eyes:  Negative      ENT:  Negative      Respiratory:  Positive for wheezing     Cardiovascular:    Positive for;palpitations;dizziness    Gastroenterology: Negative      Genitourinary:  Negative      Musculoskeletal:  Negative      Neurologic:  Negative      Psychiatric:  Negative      Heme/Lymph/Imm:  Negative      Endocrine:  Positive for diabetes      Physical Exam:  Vitals: /78 (BP Location: Left arm, Patient Position: Sitting, Cuff Size: Adult Large)   Pulse 94   Wt 75.3 kg (166 lb)   SpO2 96%   BMI 27.62 kg/m      Constitutional:  cooperative, alert and oriented, well developed, well nourished, in no acute distress        Skin:  warm and dry to the touch, no apparent skin lesions or masses noted          Head:  normocephalic, no masses or lesions        Eyes:  pupils equal and round, conjunctivae and lids unremarkable, sclera white, no xanthalasma, EOMS intact, no nystagmus        Lymph:No Cervical lymphadenopathy present     ENT:  no pallor or cyanosis, dentition good        Neck:  carotid pulses are full and equal bilaterally, JVP normal, no carotid bruit        Respiratory:  normal breath sounds, clear to auscultation, normal A-P diameter, normal symmetry, normal respiratory excursion, no use of accessory muscles         Cardiac: regular rhythm, normal S1/S2, no S3 or S4, apical impulse not displaced, no murmurs, gallops or rubs                                                         GI:  abdomen soft, non-tender, BS normoactive, no mass, no HSM, no bruits        Extremities and Muscular Skeletal:  no deformities, clubbing, cyanosis, erythema observed               Neurological:  no gross motor deficits        Psych:  Alert and Oriented x 3        CC  Cee Lozano MD  [address] [address]

## 2021-04-23 ENCOUNTER — TELEPHONE (OUTPATIENT)
Dept: FAMILY MEDICINE | Facility: CLINIC | Age: 81
End: 2021-04-23

## 2021-04-23 NOTE — TELEPHONE ENCOUNTER
Daughter calls to report blood pressure of 180/107.  No headache, no dizziness, no vision changes.  Patient is hungry.  Had been on 40mg Losartan and is now taking 50 mg    Daughter will recheck the blood pressure.  They are wondering what they need to do.  Please advise.  Preethi Recinos RN  [facility]

## 2021-04-23 NOTE — TELEPHONE ENCOUNTER
lynni-  Spoke dtr and the bp is now 140/73  They will monitor for the weekend and let us know if the bp goes up again. I also let them know that the UC is open as well.  I did advise that they make an appointment with you, as I think they may be confusing the atorvastatin 40mg with the losartan 50mg    Thanks!     Sheryl Lamb RN

## 2021-04-23 NOTE — TELEPHONE ENCOUNTER
Reason for call:  Patient reporting a symptom      Symptom or request: blood pressure elevated    Duration (how long have symptoms been present): today    Have you been treated for this before? Yes    Additional comments: call back    Phone Number patient can be reached at:  Cell number on file:    Telephone Information:   Mobile [phone]        Best Time:  Today     Can we leave a detailed message on this number:  Not Applicable    Call taken on 4/23/2021 at 2:41 PM by Megha Pinedo

## 2021-04-23 NOTE — TELEPHONE ENCOUNTER
losartan never come sin 40 mg   Was on losartan 50/hctz 12.5 this was discontinued and changed to [id number] alone as BP running low in clinic and sodium was very low  Not returned for follow up as recommended  Cannot confirm BP   Can increase to 75 mg of losartan and make an apt in person in clinic for recheck BP and labs   Need 40 min with her or even 60 mins given prior interactions a lot to discuss

## 2021-04-26 ENCOUNTER — IMMUNIZATION (OUTPATIENT)
Dept: NURSING | Facility: CLINIC | Age: 81
End: 2021-04-26
Payer: COMMERCIAL

## 2021-04-26 PROCEDURE — 91300 PR COVID VAC PFIZER DIL RECON 30 MCG/0.3 ML IM: CPT

## 2021-04-26 PROCEDURE — 0001A PR COVID VAC PFIZER DIL RECON 30 MCG/0.3 ML IM: CPT

## 2021-05-05 ENCOUNTER — APPOINTMENT (OUTPATIENT)
Dept: INTERPRETER SERVICES | Facility: CLINIC | Age: 81
End: 2021-05-05
Payer: COMMERCIAL

## 2021-05-07 ENCOUNTER — OFFICE VISIT (OUTPATIENT)
Dept: FAMILY MEDICINE | Facility: CLINIC | Age: 81
End: 2021-05-07
Payer: COMMERCIAL

## 2021-05-07 ENCOUNTER — TELEPHONE (OUTPATIENT)
Dept: FAMILY MEDICINE | Facility: CLINIC | Age: 81
End: 2021-05-07

## 2021-05-07 ENCOUNTER — APPOINTMENT (OUTPATIENT)
Dept: INTERPRETER SERVICES | Facility: CLINIC | Age: 81
End: 2021-05-07
Payer: COMMERCIAL

## 2021-05-07 VITALS
HEIGHT: 64 IN | OXYGEN SATURATION: 98 % | TEMPERATURE: 98.4 F | BODY MASS INDEX: 28.85 KG/M2 | SYSTOLIC BLOOD PRESSURE: 137 MMHG | RESPIRATION RATE: 16 BRPM | HEART RATE: 84 BPM | DIASTOLIC BLOOD PRESSURE: 74 MMHG | WEIGHT: 169 LBS

## 2021-05-07 DIAGNOSIS — D64.9 CHRONIC ANEMIA: Primary | ICD-10-CM

## 2021-05-07 DIAGNOSIS — E61.1 IRON DEFICIENCY: ICD-10-CM

## 2021-05-07 DIAGNOSIS — H90.5 SENSORINEURAL HEARING LOSS (SNHL), UNSPECIFIED LATERALITY: ICD-10-CM

## 2021-05-07 DIAGNOSIS — E04.2 MULTINODULAR GOITER: ICD-10-CM

## 2021-05-07 DIAGNOSIS — D72.829 LEUKOCYTOSIS, UNSPECIFIED TYPE: ICD-10-CM

## 2021-05-07 DIAGNOSIS — Z59.9 FINANCIAL DIFFICULTIES: ICD-10-CM

## 2021-05-07 DIAGNOSIS — K44.9 HIATAL HERNIA: ICD-10-CM

## 2021-05-07 DIAGNOSIS — I10 ESSENTIAL HYPERTENSION: ICD-10-CM

## 2021-05-07 DIAGNOSIS — N18.30 STAGE 3 CHRONIC KIDNEY DISEASE, UNSPECIFIED WHETHER STAGE 3A OR 3B CKD (H): ICD-10-CM

## 2021-05-07 DIAGNOSIS — Z23 NEED FOR DIPHTHERIA-TETANUS-PERTUSSIS (TDAP) VACCINE: ICD-10-CM

## 2021-05-07 DIAGNOSIS — E11.9 CONTROLLED TYPE 2 DIABETES MELLITUS WITHOUT COMPLICATION, WITHOUT LONG-TERM CURRENT USE OF INSULIN (H): ICD-10-CM

## 2021-05-07 DIAGNOSIS — Z23 NEED FOR SHINGLES VACCINE: ICD-10-CM

## 2021-05-07 DIAGNOSIS — I67.9 CEREBROVASCULAR SMALL VESSEL DISEASE: ICD-10-CM

## 2021-05-07 DIAGNOSIS — L85.3 DRY SKIN DERMATITIS: ICD-10-CM

## 2021-05-07 DIAGNOSIS — E87.1 HYPONATREMIA: ICD-10-CM

## 2021-05-07 DIAGNOSIS — R42 DIZZINESS: ICD-10-CM

## 2021-05-07 DIAGNOSIS — D64.9 CHRONIC ANEMIA: ICD-10-CM

## 2021-05-07 DIAGNOSIS — R00.2 PALPITATIONS: ICD-10-CM

## 2021-05-07 DIAGNOSIS — G47.9 SLEEP TROUBLE: Primary | ICD-10-CM

## 2021-05-07 DIAGNOSIS — Z86.79 H/O LEFT BUNDLE BRANCH BLOCK: ICD-10-CM

## 2021-05-07 DIAGNOSIS — Z00.00 HEALTHCARE MAINTENANCE: ICD-10-CM

## 2021-05-07 LAB
ALBUMIN SERPL-MCNC: 3.2 G/DL (ref 3.4–5)
ALP SERPL-CCNC: 157 U/L (ref 40–150)
ALT SERPL W P-5'-P-CCNC: 34 U/L (ref 0–50)
ANION GAP SERPL CALCULATED.3IONS-SCNC: 9 MMOL/L (ref 3–14)
AST SERPL W P-5'-P-CCNC: 17 U/L (ref 0–45)
BASOPHILS # BLD AUTO: 0 10E9/L (ref 0–0.2)
BASOPHILS NFR BLD AUTO: 0.3 %
BILIRUB SERPL-MCNC: 0.3 MG/DL (ref 0.2–1.3)
BUN SERPL-MCNC: 23 MG/DL (ref 7–30)
CALCIUM SERPL-MCNC: 9.1 MG/DL (ref 8.5–10.1)
CHLORIDE SERPL-SCNC: 97 MMOL/L (ref 94–109)
CO2 SERPL-SCNC: 23 MMOL/L (ref 20–32)
CREAT SERPL-MCNC: 0.81 MG/DL (ref 0.52–1.04)
DIFFERENTIAL METHOD BLD: ABNORMAL
EOSINOPHIL # BLD AUTO: 0.4 10E9/L (ref 0–0.7)
EOSINOPHIL NFR BLD AUTO: 2.8 %
ERYTHROCYTE [DISTWIDTH] IN BLOOD BY AUTOMATED COUNT: 13.1 % (ref 10–15)
FERRITIN SERPL-MCNC: 32 NG/ML (ref 8–252)
GFR SERPL CREATININE-BSD FRML MDRD: 68 ML/MIN/{1.73_M2}
GLUCOSE SERPL-MCNC: 140 MG/DL (ref 70–99)
HBA1C MFR BLD: 6.4 % (ref 0–5.6)
HCT VFR BLD AUTO: 33.4 % (ref 35–47)
HGB BLD-MCNC: 10.8 G/DL (ref 11.7–15.7)
IRON SATN MFR SERPL: 8 % (ref 15–46)
IRON SERPL-MCNC: 29 UG/DL (ref 35–180)
LYMPHOCYTES # BLD AUTO: 2.8 10E9/L (ref 0.8–5.3)
LYMPHOCYTES NFR BLD AUTO: 20.9 %
MCH RBC QN AUTO: 28.4 PG (ref 26.5–33)
MCHC RBC AUTO-ENTMCNC: 32.3 G/DL (ref 31.5–36.5)
MCV RBC AUTO: 88 FL (ref 78–100)
MONOCYTES # BLD AUTO: 0.7 10E9/L (ref 0–1.3)
MONOCYTES NFR BLD AUTO: 5.3 %
NEUTROPHILS # BLD AUTO: 9.5 10E9/L (ref 1.6–8.3)
NEUTROPHILS NFR BLD AUTO: 70.7 %
PLATELET # BLD AUTO: 378 10E9/L (ref 150–450)
POTASSIUM SERPL-SCNC: 5 MMOL/L (ref 3.4–5.3)
PROT SERPL-MCNC: 7.7 G/DL (ref 6.8–8.8)
RBC # BLD AUTO: 3.8 10E12/L (ref 3.8–5.2)
SODIUM SERPL-SCNC: 129 MMOL/L (ref 133–144)
TIBC SERPL-MCNC: 360 UG/DL (ref 240–430)
WBC # BLD AUTO: 13.4 10E9/L (ref 4–11)

## 2021-05-07 PROCEDURE — 82728 ASSAY OF FERRITIN: CPT | Performed by: FAMILY MEDICINE

## 2021-05-07 PROCEDURE — 80053 COMPREHEN METABOLIC PANEL: CPT | Performed by: FAMILY MEDICINE

## 2021-05-07 PROCEDURE — 83540 ASSAY OF IRON: CPT | Performed by: FAMILY MEDICINE

## 2021-05-07 PROCEDURE — 83036 HEMOGLOBIN GLYCOSYLATED A1C: CPT | Performed by: FAMILY MEDICINE

## 2021-05-07 PROCEDURE — 85025 COMPLETE CBC W/AUTO DIFF WBC: CPT | Performed by: FAMILY MEDICINE

## 2021-05-07 PROCEDURE — 36415 COLL VENOUS BLD VENIPUNCTURE: CPT | Performed by: FAMILY MEDICINE

## 2021-05-07 PROCEDURE — 83550 IRON BINDING TEST: CPT | Performed by: FAMILY MEDICINE

## 2021-05-07 PROCEDURE — 99215 OFFICE O/P EST HI 40 MIN: CPT | Performed by: FAMILY MEDICINE

## 2021-05-07 RX ORDER — LOSARTAN POTASSIUM 50 MG/1
50 TABLET ORAL DAILY
Qty: 90 TABLET | Refills: 1 | Status: SHIPPED | OUTPATIENT
Start: 2021-05-07 | End: 2021-05-26

## 2021-05-07 SDOH — ECONOMIC STABILITY - INCOME SECURITY: PROBLEM RELATED TO HOUSING AND ECONOMIC CIRCUMSTANCES, UNSPECIFIED: Z59.9

## 2021-05-07 ASSESSMENT — MIFFLIN-ST. JEOR: SCORE: 1217.61

## 2021-05-07 NOTE — PATIENT INSTRUCTIONS
Its normal to sleep less as one gets older  DM and HTN and High cholesterol over time and age can also affect the mind and increase confusion in the evenings  Make sure room is dark, quite and cold to allow for natural restorative sleep  Exercise during the day and staying active will help sleep  Avoid all tea and coffee after 4 pm as caffeine can affect sleep and heart and blood pressure  If oatmeal helps continue same   Blood pressure is good, continue losartan 50 mg daily  Meds same for now   Normal BP is anything above 90 / 60 up to 150/90 for her age  Labs to recheck anemia, kidney function, diabetes and sodium today & will make further recommendations after that  Complete covid vaccine on 5/17 as planned  Later consider getting Tdap and shingles vaccine as due  Diabetes eye check is due please make an apt for that  Opted not to do echo and Holter ordered by cardiology   Follow up in 3 months

## 2021-05-07 NOTE — LETTER
[address] [place] [address]  [phone]          May 10, 2021    Dano Bowser                                                                                                                     [address]  [facility] [address]            Dear Dano,    Here is the message from Dr. Lozano regarding your lab results:    1. Your diabetes remains well controlled  2. Sodium remains low at 129 and Dr. Lozano does not know why   A. There can be multiple causes including a condition called SIADH (Syndrome of Inappropriate Antidiuretic Hormone), which is a loss of sodium from the kidneys, which itself can be due to many causes including age, medicines, cancer, infection, etc.   B. Dr. Lozano recommends restricting fluids to no more than 1500 mL per day and recheck sodium in a few weeks with a lab appointment.  Fluids include water, coffee, tea, soup, ice cream, popsicles, etc.  3. The rest of your kidney function and other electrolytes are normal  4. Normally, Dr. Lozano would refer to a kidney specialist (Nephrology) for further evaluation of the low sodium level but she understands cost is a big limiting factor  5. Liver tests showed a mildly elevated liver enzyme, called Alkaline phophatase.  The cause of this is not known.  6. Complete blood count shows minimally elevated white blood cell count but there was no evidence of infection during the office visit and your vital signs (blood pressure, pulse, oxygen) were normal   A. Please check your temperature and blood sugar at night when you feel warm.  If you have a fever or blood sugar that is high or low, please let Dr. Lozano know  7. Hemoglobin is low at 10.8.  This means you continue to have anemia   A. This result is slightly less than when it was checked 2 months ago   B. You now have evidence of iron deficiency but your iron stores (ferritin) is normal.  These results suggest you are losing blood from somewhere  8.  The most common reason for blood loss in someone your age can be GI related, such as an ulcer, polyp, cancer, etc.  9. You mentioned warmth in your chest at night and you have a history of a hiatal hernia, so this could be the possible source  10. Normally, Dr. Lozano recommends an EGD and colonoscopy as a way to evaluate cause of anemia  11. Dr. Lozano feels seeing a kidney specialist and completing the EGD and colonoscopy are medically indicated.  Without having additional information/evaluation, Dr. Lozano is limited in how she can appropriately treat you  12. Dr. Lozano recommends trying over-the-counter Pepcid 20 mg twice a day to see if it helps with warmth in your chest when laying down.   13. Dr. Lozano advises starting a daily multivitamin with iron to see if this will help your iron levels.  The multivitamin with iron can be purchased over-the-counter  14. Dr. Lozano recommends sitting upright 45 minutes after each meal, avoid eating 3 hours before bedtime and decrease spicy foods  15. These recommended measures are symptomatic cares but without knowing the exact cause through further investigation and specialists she cannot prevent things from possibly worsening or missing something serious.  16. Dr. Lozano referred you to a Care Coordinator to assist with your financial concerns            Sincerely,         Your [facility] Care Team

## 2021-05-07 NOTE — PROGRESS NOTES
Assessment & Plan     Sleep trouble: new problem  80-year-old female who speaks Indonesian only.  She came to the clinic with her daughter who does not speak English either.  The clinic visit was conducted through a phone  today. Unfortunately, the communication was quite difficult because the  was mostly talking to the daughter, with only partial translation for me, and I do not understand their language. Not seen since first time in 2/2021 when advised follow up in 1 month. Previously has declined seeing neuro, PT,  for falls and & declined seeing hematology & GI for hx of anemia. Did see cardiology in march and advised an echo and ziopatch at that time which have not been done yet. Here to discuss trouble sleeping and also follow up on many overdue concerns from last visit    Counseled it was  normal to sleep less as one gets older. DM and HTN and High cholesterol over time and age can also affect the mind and increase confusion in the evenings. Daughter claries she is not confused. That sleep is not the issue but looking to evaluate the sensation of warmth in chest that disturbs her sleep last few weeks. Could be from GERD/ hiatal hernia, caffeine, low iron or cardiac. counseled to make sure room is dark, quite and cold to allow for natural restorative sleep. Exercise during the day and staying active will help sleep. Avoid all tea and coffee after 4 pm as caffeine can affect sleep and heart and blood pressure. If oatmeal helps to continue same. May try melatonin 1 mg bedtime if desired.   Labs to recheck anemia, kidney function, diabetes and sodium today & will make further recommendations after that  Blood pressure is good, continue losartan 50 mg daily. Meds same for now   Normal BP is anything above 90 / 60 up to 150/90 for her age  To complete covid vaccine on 5/17 as planned  Later consider getting Tdap and shingles vaccine as due  Diabetes eye check is due & reminded about that  Has  opted not to do the echo & Holter ordered by cardio. Report cardiology told them every thing was normal. Has had no more falls, dizziness or palpitations. Feels not necessary to see neurology & PT & likely does not need now. hesitant to see any specialist like hematology or GI for anemia or nephrology for hyponatremia or do dexa or mammogram due to cost. Referral to care coordinator made again for hx of financial difficulties. Initially advised to follow up in 6 weeks but then changed to 3 months given cost. counseled though that recommendations could change based on lab results not back at time of the apt.    Addendum after labs came back Friday night 5/8/21 will make following recommendations by having triage call daughter  Diabetes remains well controlled  Sodium remains low at 129. Not sure why. There can be multiple causes, including SIADH, which itself can be due to numerous reasons including age, medicines, cancer, infection etc.    Will recommend restricting fluids to no more than 1500 ml total a day ( this would include water, coffee soup, ice creams etc) & recheck sodium in few weeks in a lab only apt  Rest of kidney function and other electrolytes wnl  I Would normally refer to nephrology to further evaluate and treat but daughter reported cost is a big limiting factor.  Liver tests showed mild elevated alk phos of unknown cause  CBC shows white count minimally elevated but had no evidence of infection at visit and vitals were normal. Will advise to check temperature & blood sugar at night when reports feeling warmth to make sure these are not causes.  Also hemoglobin is low at 10.8 so continues with anemia and its slightly less than 2 months ago and now also has evidence of ion deficiency though iron storage from ferritin is still wnl. This suggests she might be loosing blood from some where  Most likely reason in some one her age could be GI losses, cannot rule out an ulcer, polyp, cancer etc  She  reported no weight loss, blood in stools or black stool  She did mention warmth in chest at night and has a history of hiatal hernia so this could be a possible source from chronic gastritis & slow loss as is asymptomatic.  Normally I would recommend an EGD/ colonoscopy as one step to evaluate cause of anemia  Family said they only want me to suggest things that are necessary as cost is a big issue to them and cannot afford the health care bills  I feel these suggestions are medically indicated yet I under stand that cost is prohibitive. Without knowing what exactly is going on I am limited in how I can appropriately treat her  She may try Pepcid 20 mg twice a day to see if will help warmth in chest when lying down and if will help anemia and take a multivitamin with iron OTC daily to see if will help iron. Also to sit upright for 45 min after each meal, avoid eating 3 hrs before bedtime, & decrease spicy foods.  These measures are just symptomatic care but without knowing the exact cause through further investigations and specialists I may not be able to prevent things from possibly getting worse or missing something serious  She was referred to the care coordinator as well due to financial constraints.   - Ferritin  - CARE COORDINATION REFERRAL    Essential hypertension  Stable. Continue Losartan 50 mg daily.   - Comprehensive metabolic panel (BMP + Alb, Alk Phos, ALT, AST, Total. Bili, TP)  - losartan (COZAAR) 50 MG tablet; Take 1 tablet (50 mg) by mouth daily  - CARE COORDINATION REFERRAL    Controlled type 2 diabetes mellitus without complication, without long-term current use of insulin (H)  Stable, tolerating meds. Kidney function not worse. Continue metformin, consider DM eye check.   - Comprehensive metabolic panel (BMP + Alb, Alk Phos, ALT, AST, Total. Bili, TP)  - Hemoglobin A1c  - CARE COORDINATION REFERRAL  - metFORMIN (GLUCOPHAGE) 850 MG tablet; Take 1 tablet (850 mg) by mouth daily (with  dinner)    Chronic anemia  New , worsening. Asymptomatic. Low iron might be affecting sleep at night. hemoglobin is low at 10.8 so continues with anemia and its slightly less than 2 months ago and now also has evidence of ion deficiency though iron storage from ferritin is still wnl. This suggests she might be loosing blood from some where  Most likely reason in some one her age could be GI losses, cannot rule out an ulcer, polyp, cancer etc  She reported no weight loss, blood in stools or black stool  She did mention warmth in chest at night and has a history of hiatal hernia so this could be a possible source from chronic gastritis & slow loss as is asymptomatic.  Normally I would recommend an EGD/ colonoscopy as one step to evaluate cause of anemia  Family said they only want me to suggest things that are necessary as cost is a big issue to them and cannot afford the health care bills  I feel these suggestions are medically indicated yet I under stand that cost is prohibitive. Without knowing what exactly is going on I am limited in how I can appropriately treat her  She may try Pepcid 20 mg twice a day to see if will help warmth in chest when lying down and if will help anemia and take a multivitamin with iron OTC daily to see if will help iron. Also to sit upright for 45 min after each meal, avoid eating 3 hrs before bedtime, & decrease spicy foods.  These measures are just symptomatic care but without knowing the exact cause through further investigations and specialists I may not be able to prevent things from possibly getting worse or missing something serious  - Ferritin  - CBC with platelets and differential  - Iron and iron binding capacity  - CARE COORDINATION REFERRAL    Stage 3 chronic kidney disease, unspecified whether stage 3a or 3b CKD  Stable continue on ARB.  - CBC with platelets and differential  - CARE COORDINATION REFERRAL    Hyponatremia  Worsening. Asymptomatic per visit. Sodium remains low  at 129. Not sure why. There can be multiple causes, including SIADH, which itself can be due to numerous reasons including age, medicines, cancer, infection etc.    Will recommend restricting fluids to no more than 1500 ml total a day ( this would include water, coffee soup, ice creams etc) & recheck sodium in few weeks in a lab only apt. Rest of kidney function and other electrolytes wnl. I would normally refer to nephrology to further evaluate and treat but daughter reported cost is a big limiting factor.  - Comprehensive metabolic panel (BMP + Alb, Alk Phos, ALT, AST, Total. Bili, TP)  - CARE COORDINATION REFERRAL    Cerebrovascular small vessel disease  On ct  No symptoms. Difficult to assess given cultural & language barrier, limitations of phone interpretation, that gaby does not wear her hearing aids & daughter does all the talking.   - CARE COORDINATION REFERRAL    H/O left bundle branch block on EKG.   Asymptomatic. Seen by cardiology. Opted not to do echo & Holter to further evaluate due to cost.   - CARE COORDINATION REFERRAL    Dizziness  Reported resolved  - CBC with platelets and differential  - CARE COORDINATION REFERRAL    Palpitations  Reported resolved  - CBC with platelets and differential  - CARE COORDINATION REFERRAL    Hiatal hernia  She did mention warmth in chest at night and has a history of hiatal hernia so this could be a possible source from chronic gastritis & slow loss as is asymptomatic.  Normally I would recommend an EGD/ colonoscopy as one step to evaluate cause of anemia. Family said they only want me to suggest things that are necessary as cost is a big issue to them and cannot afford the health care bills  I feel these suggestions are medically indicated yet I under stand that cost is prohibitive. Without knowing what exactly is going on I am limited in how I can appropriately treat her. She may try Pepcid 20 mg twice a day to see if will help warmth in chest when lying down and  if will help anemia and take a multivitamin with iron OTC daily to see if will help iron. Also to sit upright for 45 min after each meal, avoid eating 3 hrs before bedtime, & decrease spicy foods.  - CBC with platelets and differential  - CARE COORDINATION REFERRAL    Multinodular goiter  Stable no obstructive or functional symptoms or signs. TSH earlier this year was wnl.   - CARE COORDINATION REFERRAL    Sensorineural hearing loss (SNHL), unspecified laterality  Not wearing her hearing aids makes visits challenging.   - CARE COORDINATION REFERRAL    Dry skin dermatitis  Improved with skin cares, Aquaphor & tac prn.   - CARE COORDINATION REFERRAL    Need for diphtheria-tetanus-pertussis (Tdap) vaccine  Need for shingles vaccine  Wait to offer till after completes covid second shot.   - CARE COORDINATION REFERRAL    Healthcare maintenance  - REVIEW OF HEALTH MAINTENANCE PROTOCOL ORDERS  - CARE COORDINATION REFERRAL    Financial difficulties  Challenging appropriate care.   - CARE COORDINATION REFERRAL    Review of the result(s) of each unique test - cbc, iron, cmp   Ordering of each unique test  Prescription drug management  40 minutes spent on the date of the encounter doing chart review, history and exam, documentation and further activities per the note     Work on weight loss  Regular exercise  See Patient Instructions    Return in about 3 months (around 8/7/2021) for Follow up, with me, in person.    Cee Lozano MD  [facility]    Fili Matson is a 80 year old who presents for the following health issues  accompanied by her  on phone and daughter.    HPI   Pt states she concerned that maybe she is anemic and high BP, as well as the  reason why she is waking up in the middle of the night.     Hypertension Follow-up    Do you check your blood pressure regularly outside of the clinic? No     Are you following a low salt diet? No    Are your blood pressures ever more  than 140 on the top number (systolic) OR more   than 90 on the bottom number (diastolic), for example 140/90? Yes    How many servings of fruits and vegetables do you eat daily?  0-1    On average, how many sweetened beverages do you drink each day (Examples: soda, juice, sweet tea, etc.  Do NOT count diet or artificially sweetened beverages)?   0    How many days per week do you exercise enough to make your heart beat faster? 3 or less    How many minutes a day do you exercise enough to make your heart beat faster? 9 or less    How many days per week do you miss taking your medication? 0    She is an 80-year-old female who speaks Turkish only.  She came to the clinic with her daughter who does not speak English either.  The clinic visit was conducted through a phone  today. Unfortunately, the communication was quite difficult because the  was mostly talking to the daughter, with only partial translation for me, and I do not understand their language. Not seen since first time in 2/2021 when advised follow up in 1 month. Previously has declined seeing neuro, PT,  for falls, nephrology for hyponatremia & declined seeing hematology & GI for hx of anemia. Did see cardiology in march and advised an echo and ziopatch at that time which have not been done yet. Here to discuss trouble sleeping and also follow up on many overdue concerns from last visit. There is also confusion on meds.     Notes in the last 2 weeks, Dano has trouble staying asleep as feels warm in her chest. No temperature or glucose or BP checked at the time. It was not an issue of not being able to sleep. They are not looking for sleep meds. They are here to smith sure nothing else is going on that might be contributing to her symptoms. When eats oatmeal sleeps better. Drinks a lot of coffee sometimes after 6 pm.  Reports No fever or chills, no headache or dizziness, no double or blurry vision, no facial pain, earache, sore throat,  runny nose, post nasal drip, no trouble hearing, smelling, tasting or swallowing, no cough, no chest pain, trouble breathing or palpitations, No abdominal pain, heart burn, reflux, nausea or vomiting or diarrhea or constipation, no blood in stools or black stools, no weight loss or night sweats. No dysuria, hematuria, frequency, urgency, hesitancy, incontinence, No pelvic complaints. No leg swelling or joint pain. No rash.  Previously labs sodium was low and has anemia & would like checked. Here also for HTN & DM check.  Decline to do any specialist care or additional testing due to cost. Want only to do that what is absolutely necessary. Previously going to a community clinic but told could only do that for 1 year. Would like to speak to the  about financial difficulties & health care bills. Feels needs more help from the county.  HTN not checked BP recently on losartan 50  DM: no checking sugars on metformin  Chronic anemia: no bleeding or bruising or black stools   CKD3 , has declined nephrology consult  Hyponatremia, hx of small vessel disease on brain imaging/l daughter reports no confusion.   Hx of LBBB no symptoms, resolved dizziness, palpitations, no further fall, seen by cardio, unable to do echo & Holter due to cost  Goiter, euthyroid, TSH recently normal, no symptoms of obstruction  SNHL doesnt wear her hearing aids. Daughter does all the talking.   Dry skin dermatitis on Aquaphor & tac prn. No concerns today  Needs Tdap & shingles but recently had covid & due for 2nd covid vaccine on 5/17    BACKGROUND  80 yr old [place] lady with hx of DM on metformin( HBA1c 7.3 in [facility]), HLD on Lipitor, HTN previously on losartan-hydrochlorothiazide now on losartan only, LBBB on EKG, resolved dizziness, vertigo, palpitations, hx of fall, ecoli & klebsiella UTI, hx of urge incontinence, small asymptomatic hiatal hernia noted on CT in 2018 and asymptomatic  diverticulum of colon noted at that time too, noted hx of chronic anemia, hx of lactose intolerance, vit d deficiency on calcium and vit d, hx of intermittent hyponatremia secondary to GE in 2018 and dehydration in 1/2021, hx of a right chest wall lipoma s/p excision in 2018, with SNHL supposed to be wearing hearing aids under care of ENT / audiology at [facility], hx of urge incontinence, resolved ceruminous, hx of periodontal disease & tooth extraction of a fractured tooth in nov 2019.  Under care of dentist and hx of allergic conjunctivitis under care of eye, under care of PCP Chelsea Dominguez at [facility] many years, last got labs for her and meds refilled in Jan 2021.    Seen in  first time in [facility] system on 2/5/21 for complain of dizziness vertigo, fatigue, hx of a fall & unsteady gait. Was referred to the ER to evaluate for stroke. [facility]. History limited due to patient's hearing impairment. For the prior 2 days, patient had had dizziness. She stated she got out of bed 2 days  prior and fell. She did not seek medical evaluation at that time. Since that fall, she had had persistent dizziness and  heart pain . She was able to walk but took short steps. No weakness noted in her arms or legs. Had No vision changes. No abdominal pain. No difficulty breathing. No recent fever or chills. No cough. No other concerns. Noted Mildly hypertensive on arrival otherwise normal vitals. There was very limited ability to obtain a full neurologic exam given that the patient was very hard of hearing and non-English speaking. However, ER did not appreciate any focal neurologic deficits. She clarified  she did not have chest pain just dizziness, and when the doctor was examining her felt her heart was beating fast. Urinalysis shows small amount leukocyte esterase and white cells, however contaminated with mucus. History inconsistent with UTI . Mild leukocytosis, however  similar to values obtained last year.  CXR Shallow inspiration. Heart size was normal. Minimal plate like atelectasis at the left lung base. The right lung was clear. No pleural effusions. No pneumothorax. The bones wee demineralized. There was degenerative change in the spine and left shoulder. Sodium(!): 127 Hemoglobin at baseline. Troponin I: 0.03 EKG showed LBBB morphology, sinus rhythm, 2-3mm ST elevation in leads V2-V4, did not meet sgarbossa criteria. No previous EKG for comparison. MRI brain showed no evidence of acute infarction or other acute intracranial abnormality. Findings compatible with moderate small vessel ischemic change. Mild generalized atrophy.   Discussed admission for further workup and management given was still experiencing dizziness, however, patient was adamant that she did not want to stay and wished to leave. Discussed risks and benefits of this decision and they felt she had the capacity to make the decision.     TE done first time 2/8/2021 for hospital discharge follow up and Noted was hydrated with 0.9 % normal saline & Vertigo had resolved, dizziness only when got up suddenly. No longer with palpitations or fatigue. Noted some Urge incontinence & wears diapers. No dysuria, hematuria, frequency, hesitancy, No pelvic complaints. Noted used hydrocortisone for a chronic skin patch. Noted had been asymptomatic for urinary concerns but ER did do a UA/UC and later Culture grew out: >100,000 klebsiella pneumoniae & > 100,000 e coli, noting that Both were pan sensitive EXCEPT the Klebsiella was resistant to nitrofurantoin. There was a cultural and lingual barrier ( speaks French) and an  was utilized. Didn t hear patient speak at all. Daughter did all the talking on the phone. Given Bactrim bid 7 days for an Ecoli and klebsiella UTI noted incidentally when seen in ER for dizziness. Declined seeing neuro, cardio or PT at that time. Noted dizziness had improved and fatigue and  palpitations had resolved and gait had improved per daughter who did all the talking on the phone following a hx of fall. In ER noted to have a LBBB with no prior to compare to. Felt hyponatremia seen was due to dehydration. Discussed DM, HTN HLD, multinodular goiter and vit d def briefly & Dm supplies sent in.     Labs done 2/15/21 showed normal Vit d, n/n anemia stable to prior, wbc slightly elevated with no left shift. Normal micro albumin, above normal B 12, normal iron, LFT, electrolytes and sodium improved to 132, TSH was normal. Kidney function was mildly decreased to prior with creatinine of 1.18 and GFR of 50 & HBA1c was goal as was lipids.    Seen first time in person on 2/24/21 in a physical and follow up from the prior visit.   Language and cultural barrier and used a telephone  which was not ideal. Daughter with did most of the talking. Declined mammogram due to covid &   declined dexa scan due to cost. Noted was doing better, had no falls since last seen  Noted was not checking sugars. Daughter reported they asked for test strips, these were sent in but then pharmacy said it was not covered by insurance to send in a whole new script for glucometer kit and strips that would be covered by her insurance. This was sent in but daughter reported that they were going to be charged for that too and felt was too expensive so it was not picked up and they had not been checking the blood glucose. Was on metformin 850 mg daily. GFR recently mildly decreased but HBA1c was wnl. Had had no lows and tolerating med fine & refill given.    BP was on lower end and creatinine elevated and sodium trending low though improved. Had no more vertigo or positional light headiness & was to await lab results to adjust BP meds.  HLD on atorvastatin. Lipids recently goal & meds refilled. Anemia , noted chronic. They seemed unaware of it.  Reported no known bleeding or bruising from anywhere. Hesitant to see hematology  due to expense. Advised to Increase iron rich foods. Recent iron ady. Will check ferritin next time. Could be anemia of chronic disease related to CKD3.   CKD 3, mild worsening on arb, ok to recheck. Hesitant to see nephrology due to cost  For Hyponatremia, noted in past had occurred when dehydrated.  Noted small vessel disease on recent MRI, mini cog ? Accuracy, language, cultural barrier & very deaf not using her hearing aid. Dizziness reported resolved. Declined neuro or PT eval due to cost. Noted generalized weakness, but couldn't describe, that had been going on sometime, got around at home holding on to things, walked short distance on her own. Used daughter as prop to walk. due to cost & time constraints declined PT & further eval of this. Desired DMV form to be filled for handicap parking. She did not drive but daughter drove her everywhere. Never had disability parking tickets before. DMV form filled.   Noted resolved palpitations, hx of dizziness. LBBB noted on EKG in er recently. No old to compare. Declined cardiology consult due to cost.   Hx of hiatal hernia, denied any reflux, blood in stools or black stools Multinodular goiter but had no symptoms, recent TSH wnl. Had completed abxs & had no UTI symptoms. SNHL seen by ENT supposed to be wearing hearing aids.  Reminded to make a dilated eye exam yearly & foot exam was normal. A care coordinator referral made given cost issues.  Appeared to have dry skin dermatitis & patches of post inflammatory hyperpigmentation. Advised avoiding long hot showers. Try Aquaphor to skin daily after a shower & tac cream only to itchy flared areas twice daily for 2 weeks prn & not to use much as could thin skin & cause hypopigmentation. Advised no med was necessary for post inflammatory hyperpigmented areas as they would resolve on their own with time.   Given the flu & pneumonia vaccines & advised to consider Tdap and shingles another time. Set up to get the covid  "vaccine.   Was to return in 3 months ( cbc, bmp, ferritin, HBA1c etc) for a recheck or earlier as needed.     2/24/21 telephone done noting sodium remained stable but on lower end at 132  Suspected from diuretic use & advised to change losartan/ hctz 50 -12.5 to losartan 50 mg alone as BP on low end. Was to monitor BP at home and call if trending consistently above 140/90. Her kidney function had improved back to her baseline & felt Ok to continue metformin safely.   Hemoglobin was decreased indicating anemia. But stable to prior & not indicating signs of active bleeding. Thought possibly be from hx of hiatal hernia but reported no gi symptoms, could be chronic from kidney disease related to dm, or from a undiagnosed thalassemia or something else. Family concerned about cost & additional testing & referral to hematology  & GI to evaluate further was declined. Advised could take a multivitamin with iron OTC daily     Referred to the care coordinator but they were unable to get a hold of her. Triage couldn't get a HLD of her & letter sent. Seen by cardiology 3/17/21 when noted visit was difficult, BP was normal & advised an echo & ziopatch but not done due to cost.     Called on 4/27 for elevated BP & recheck was better & advised to follow up in clinic given prior concerns.    Review of Systems   Constitutional, HEENT, cardiovascular, pulmonary, GI, , musculoskeletal, neuro, skin, endocrine and psych systems are negative, except as otherwise noted.      Objective    /74 (BP Location: Right arm, Patient Position: Chair, Cuff Size: Adult Regular)   Pulse 84   Temp 98.4  F (36.9  C) (Oral)   Resp 16   Ht 1.619 m (5' 3.75\")   Wt 76.7 kg (169 lb)   LMP  (LMP Unknown)   SpO2 98%   BMI 29.24 kg/m    Body mass index is 29.24 kg/m .  Physical Exam   GENERAL: healthy, alert, no distress and over weight  EYES: Eyes grossly normal to inspection, PERRL and conjunctivae and sclerae normal  HENT: ear canals and TM's " normal, nose and mouth without ulcers or lesions  NECK: no adenopathy, no asymmetry, masses, or scars and thyroid normal to palpation  RESP: lungs clear to auscultation - no rales, rhonchi or wheezes  CV: regular rate and rhythm, normal S1 S2, no S3 or S4, no murmur, click or rub, no peripheral edema and peripheral pulses strong  ABDOMEN: soft, non tender, no hepatosplenomegaly, no masses and bowel sounds normal  MS: no gross musculoskeletal defects noted, no edema  SKIN: no suspicious lesions or rashes  NEURO: Normal strength and tone, mentation intact and speech normal  PSYCH: mentation appears normal, affect normal/bright    Results for orders placed or performed in visit on 05/07/21   Ferritin     Status: None   Result Value Ref Range    Ferritin 32 8 - 252 ng/mL   CBC with platelets and differential     Status: Abnormal   Result Value Ref Range    WBC 13.4 (H) 4.0 - 11.0 10e9/L    RBC Count 3.80 3.8 - 5.2 10e12/L    Hemoglobin 10.8 (L) 11.7 - 15.7 g/dL    Hematocrit 33.4 (L) 35.0 - 47.0 %    MCV 88 78 - 100 fl    MCH 28.4 26.5 - 33.0 pg    MCHC 32.3 31.5 - 36.5 g/dL    RDW 13.1 10.0 - 15.0 %    Platelet Count 378 150 - 450 10e9/L    % Neutrophils 70.7 %    % Lymphocytes 20.9 %    % Monocytes 5.3 %    % Eosinophils 2.8 %    % Basophils 0.3 %    Absolute Neutrophil 9.5 (H) 1.6 - 8.3 10e9/L    Absolute Lymphocytes 2.8 0.8 - 5.3 10e9/L    Absolute Monocytes 0.7 0.0 - 1.3 10e9/L    Absolute Eosinophils 0.4 0.0 - 0.7 10e9/L    Absolute Basophils 0.0 0.0 - 0.2 10e9/L    Diff Method Automated Method    Comprehensive metabolic panel (BMP + Alb, Alk Phos, ALT, AST, Total. Bili, TP)     Status: Abnormal   Result Value Ref Range    Sodium 129 (L) 133 - 144 mmol/L    Potassium 5.0 3.4 - 5.3 mmol/L    Chloride 97 94 - 109 mmol/L    Carbon Dioxide 23 20 - 32 mmol/L    Anion Gap 9 3 - 14 mmol/L    Glucose 140 (H) 70 - 99 mg/dL    Urea Nitrogen 23 7 - 30 mg/dL    Creatinine 0.81 0.52 - 1.04 mg/dL    GFR Estimate 68 >60  mL/min/[1.73_m2]    GFR Estimate If Black 79 >60 mL/min/[1.73_m2]    Calcium 9.1 8.5 - 10.1 mg/dL    Bilirubin Total 0.3 0.2 - 1.3 mg/dL    Albumin 3.2 (L) 3.4 - 5.0 g/dL    Protein Total 7.7 6.8 - 8.8 g/dL    Alkaline Phosphatase 157 (H) 40 - 150 U/L    ALT 34 0 - 50 U/L    AST 17 0 - 45 U/L   Hemoglobin A1c     Status: Abnormal   Result Value Ref Range    Hemoglobin A1C 6.4 (H) 0 - 5.6 %   Iron and iron binding capacity     Status: Abnormal   Result Value Ref Range    Iron 29 (L) 35 - 180 ug/dL    Iron Binding Cap 360 240 - 430 ug/dL    Iron Saturation Index 8 (L) 15 - 46 %

## 2021-05-08 PROBLEM — Z59.9 FINANCIAL DIFFICULTIES: Status: ACTIVE | Noted: 2021-05-08

## 2021-05-08 NOTE — TELEPHONE ENCOUNTER
Please update daughter  Diabetes remains well controlled  Sodium remains low at 129  Not sure why    There can be multiple causes, including a condition of SIADH due to inappropriate loss of sodium from the kidneys, which itself can be due to numerous reasons including age, medicines, cancer, infection etc.    Recommend restricting fluids to no more than 1500 ml total a day ( this would include water, coffee soup, ice creams etc) & recheck sodium in few weeks in a lab only apt    Rest of kdiney function and other electrolytes wnl    I Would normally refer to nephrology to further evaluate and treat but daughter reported cost is a big limiting factor.    Liver tests showed mild elevated alk phos of unknown cause    CBC shows white count minimally elevated but had no evidence of infection at visit and vitals were normal. Please check temperature & blood sugar at night when reports feeling warmth to make sure these are not causes.    Also hemoglobin is low at 10.8 so continues with anemia and its slightly less than 2 months ago and now also has evidence of ion deficiency though iron stoage from fertiin is still wnl  This suggests she is loosing blood from some where    Most likely reason in some one her age could be GI losses, cannot rule out an ulcer, polyp, cancer etc  She reported no weight loss, blood in stools or black stool  She did mention warmth in chest at night and has a history of hiatal hernia so this could be a possible source   Normally I would recommend and EGD/ colonoscopy as one step to evaluate cause of anemia  Family said they only want me to suggest things that are necessary as cost is a big issue to them and cannot afford the health care bills  I feel these suggestions are  Medically indicated yet I under stand tcost is prohibitive. Without knowing what exactly is going on I am limited in how I can appropriately treat her    She may try pepcid 20 mg twice a day to see if will help warmth in chest  when lying down and if will help anemia and take a multivitamin with iron otc daily to see if will help iron. Also sit upright for 45 min after each meal, avoid eating 3 hrs before bedtime, decrease spicy foods.    These measures are just symptomatic care but without knowing the exact cause through further investigations and specialists I cannot prevent things from possibly getting worse or missing something serious    She was referred to the care coordinator as well due to financial constraints.

## 2021-05-10 ENCOUNTER — PATIENT OUTREACH (OUTPATIENT)
Dept: CARE COORDINATION | Facility: CLINIC | Age: 81
End: 2021-05-10

## 2021-05-10 ENCOUNTER — APPOINTMENT (OUTPATIENT)
Dept: INTERPRETER SERVICES | Facility: CLINIC | Age: 81
End: 2021-05-10
Payer: COMMERCIAL

## 2021-05-10 NOTE — TELEPHONE ENCOUNTER
Writer called patient with [place]ian  #[id number], spoke with daughter, Margy, and reviewed message per Dr. Lozano.    Margy verbalized understanding and asked for information/recommendations from Dr. Lozano to be sent via letter.    Letter mailed to patient.    BOB CmN, RN  [facility]

## 2021-05-10 NOTE — PROGRESS NOTES
Clinic Care Coordination Contact  [facility]/Voicemail       Clinical Data: Care Coordinator Outreach  Outreach attempted x 1.  Left message on patient's voicemail with call back information and requested return call.  Plan: Care Coordinator will try to reach patient again in 1-2 business days.

## 2021-05-10 NOTE — TELEPHONE ENCOUNTER
Patient returned call from Friday.  Currently working and on break.  Please call back with [place]  after 3 PM on Monday 5/10/21.  Preethi Recinos RN  [facility]

## 2021-05-10 NOTE — TELEPHONE ENCOUNTER
Writer called daughter, Margy, with Yoruba  #[id number]:  Left message to call back and ask to speak with an available triage nurse.  BOB CmN, RN  [facility]

## 2021-05-10 NOTE — LETTER
[facility] CARE COORDINATION  [facility] [address]   [place] [address]  May 12, 2021    Dano Bowser  [address]  [place] [address]      Dear Dano,    I am a clinic community health worker who works with Chelsea Dominguez MD at [facility]. I have been trying to reach you recently to introduce Clinic Care Coordination and to see if there was anything I could assist you with.  Below is a description of clinic care coordination and how I can further assist you.      The clinic care coordination team is made up of a registered nurse,  and community health worker who understand the health care system. The goal of clinic care coordination is to help you manage your health and improve access to the health care system in the most efficient manner. The team can assist you in meeting your health care goals by providing education, coordinating services, strengthening the communication among your providers and supporting you with any resource needs.    Please feel free to contact me at [phone] with any questions or concerns. We are focused on providing you with the highest-quality healthcare experience possible and that all starts with you.     Sincerely,     ARABELLA Vale

## 2021-05-11 ENCOUNTER — APPOINTMENT (OUTPATIENT)
Dept: INTERPRETER SERVICES | Facility: CLINIC | Age: 81
End: 2021-05-11
Payer: COMMERCIAL

## 2021-05-11 NOTE — PROGRESS NOTES
Clinic Care Coordination Contact  [facility]/Voicemail       Clinical Data: Care Coordinator Outreach  Outreach attempted x 2.  Left message on patient's daughter  voicemail with call back information and requested return call.  Plan:Care Coordinator will try to reach patient again in 1-2 business days.

## 2021-05-12 NOTE — PROGRESS NOTES
Clinic Care Coordination Contact  [facility]/Voicemail       Clinical Data: Care Coordinator Outreach  Outreach attempted x 3.  Left message on patient's voicemail with call back information and requested return call.  Plan: Care Coordinator will send unable to contact letter with care coordinator contact information via mail. Care Coordinator will do no further outreaches at this time.

## 2021-05-13 DIAGNOSIS — E11.9 CONTROLLED TYPE 2 DIABETES MELLITUS WITHOUT COMPLICATION, WITHOUT LONG-TERM CURRENT USE OF INSULIN (H): Primary | ICD-10-CM

## 2021-05-13 NOTE — PROGRESS NOTES
Clinic Care Coordination Contact  Community Health Worker Initial Outreach    CHW Initial Information Gathering:  Referral Source: PCP  Current living arrangement:: I live in a private home with family  Type of residence:: Private home - stairs  Community Resources: None  Supplies used at home:: None  Equipment Currently Used at Home: none  Informal Support system:: Children  No PCP office visit in Past Year: No  Transportation means:: Family       Patient accepts CC: Yes. Patient scheduled for assessment with CCC   on 5/14/2021 at 3:00pm. Patient noted desire to discuss about medical bills.. Reason for Referral: needs help for [facility] support , medications has to pay cash.

## 2021-05-13 NOTE — TELEPHONE ENCOUNTER
blood glucose (NO BRAND SPECIFIED) test strip (Discontinued)      Last Written Prescription Date:  2-8-21  Last Fill Quantity: 100 strips,   # refills: 6  Last Office Visit: 5-7-21  Future Office visit:       Routing refill request to provider for review/approval because:  Drug not active on patient's medication list

## 2021-05-13 NOTE — TELEPHONE ENCOUNTER
Dr Lozano- Did ou want pt testing her blood sugars? Refill request received-I pended previous script and directions. Kalli Eilzabeth RN

## 2021-05-14 ENCOUNTER — PATIENT OUTREACH (OUTPATIENT)
Dept: NURSING | Facility: CLINIC | Age: 81
End: 2021-05-14
Attending: FAMILY MEDICINE
Payer: COMMERCIAL

## 2021-05-14 ASSESSMENT — ACTIVITIES OF DAILY LIVING (ADL): DEPENDENT_IADLS:: INDEPENDENT

## 2021-05-14 NOTE — PROGRESS NOTES
Clinic Care Coordination Contact    Clinic Care Coordination Contact  OUTREACH    Referral Information:  Referral Source: PCP       Reason for Referral: [facility] , medications has to pay cash.       Additional pertinent details:  See chart      Clinical Staff have discussed the Care Coordination Referral with the patient and/or caregiver: yes       Chief Complaint   Patient presents with     Clinic Care Coordination - Initial     SW        Universal Utilization:   Clinic Utilization  Difficulty keeping appointments:: No  Compliance Concerns: No  No-Show Concerns: No  No PCP office visit in Past Year: No  Utilization    Last refreshed: 5/14/2021  3:06 PM: Hospital Admissions 0           Last refreshed: 5/14/2021  3:06 PM: ED Visits 0           Last refreshed: 5/14/2021  3:06 PM: No Show Count (past year) 1              Current as of: 5/14/2021  3:06 PM              Clinical Concerns:  Current Medical Concerns:    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   Pain  Pain (GOAL):: No  Health Maintenance Reviewed: Due/Overdue   Health Maintenance Due   Topic Date Due     DEXA  Never done     EYE EXAM  Never done     DTAP/TDAP/TD IMMUNIZATION (1 - Tdap) Never done     ZOSTER IMMUNIZATION (1 of 2) Never done      COVID-19 Vaccine (2 - Pfizer 2-dose series) 05/17/2021       Clinical Pathway: None    Medication Management:  Current Outpatient Medications   Medication     atorvastatin (LIPITOR) 40 MG tablet     blood glucose (NO BRAND SPECIFIED) test strip     calcium carbonate 600 mg-vitamin D 400 units (CALTRATE) 600-400 MG-UNIT per tablet     cholecalciferol 50 MCG (2000 UT) CAPS     losartan (COZAAR) 50 MG tablet     metFORMIN (GLUCOPHAGE) 850 MG tablet     mineral oil-hydrophilic petrolatum (AQUAPHOR) external ointment     thin (NO BRAND SPECIFIED) lancets     triamcinolone (KENALOG) 0.1 % external ointment     vitamin  s/Minerals TABS     No current facility-administered medications for this visit.           Functional Status:  Dependent ADLs:: Independent  Dependent IADLs:: Independent  Bed or wheelchair confined:: No  Mobility Status: Independent  Fallen 2 or more times in the past year?: No  Any fall with injury in the past year?: No    Living Situation:  Current living arrangement:: I live alone  Type of residence:: Private [facility]    Lifestyle & Psychosocial Needs:        Diet:: Regular  Inadequate nutrition (GOAL):: No  Tube Feeding: No  Inadequate activity/exercise (GOAL):: No  Significant changes in sleep pattern (GOAL): No  Transportation means:: Family     Hoahaoism or spiritual beliefs that impact treatment:: No  Mental health DX:: No  Mental health management concern (GOAL):: No  Chemical Dependency Status: No Current Concerns  Informal Support system:: Children   Socioeconomic History     Marital status:      Spouse name: Not on file     Number of children: Not on file     Years of education: Not on file     Highest education level: Not on file     Tobacco Use     Smoking status: Never Smoker     Smokeless tobacco: Never Used   Substance and Sexual Activity     Alcohol use: Never     Frequency: Never     Drug use: Never     Sexual activity: Yes     Partners: Male                Verbal permission  given to talk to daughter, Margy. Patient is advancing in her age.  They need help for medical insurance.    [facility] asked what is patients income? She has no income. Lives with her daughter. Would like to apply for marissa care, food stamps and help for medical assistance. Denies other needs at this time. [facility] called with patient to prescription assistance and left message for Sharita to call back.      Program Interested In:    [facility] Benefits:  Insurance:  Is patient requesting help applying for insurance?    Yes- Check mn-its first and then answer the screening questions below   **If pt needs help with a renewal, you do not need to ask the questions below. Send referral to FRW**    Mn-its outcome (insert mn-its here):   (Make sure to enter social security#, PMI#, and change date of service to the year before if needed)     Screening Questions:   1. Have you recently applied for insurance or do for a renewal? If so, when? no  2. How many people in your household? 2  3. Do you file taxes, who do you claim? no  5. What is the monthly gross income for the household (wages, social security, workers comp, and pension)?         Any other information for FR?   Marissa care application, would like help with MA          Resources and Interventions:  Current Resources:      Community Resources: None  Supplies used at home:: Incontinence Supplies  Equipment Currently Used at Home: none   )   )    Advance Care Plan/Directive  Advanced Care Plans/Directives on file:: No  Advanced Care Plan/Directive Status: Declined Further Information    Referrals Placed: Prescription Assistance Programs, [facility] Resources     Goals:   Goals        General    Financial Wellbeing (pt-stated)     Notes - Note created  5/14/2021  3:20 PM by Nancy Sherwood LSW    Goal Statement: I will apply for Marissa Care within the next 1-2 weeks if I am eligible.   Date Goal Set:  05/14/21  Strengths: help from daughter  Barriers:  age; english as second  language  Date to Achieve By: 10/14/21     Patient expressed understanding of goal: yes  Action steps to achieve this goal:  1. I understand a referral was placed to the Financial Resource Worker, I will receive a call within the next 3 business days.    2. I understand the financial worker will make two attempts to call me. If I still need help with this goal, I will connect with my Community Health Worker BARBI at [phone]               Patient/Caregiver understanding: patients daughter, Margy verbalized understanding of the plan.    Outreach Frequency: monthly  Future Appointments              In 3 days VIDEO, ; OX XERXES COVID VAC 21 DAY PFIZER [facility]    In 1 month Anny Licona MD [facility] Heart [facility], P PSA CLIN    In 3 months Cee Lozano MD [facility]          Plan: Patient will apply for maninder care, insurance and county benefits.[facility] will send intro letter and CCP. [facility] will follow up in 30 days.    GERTRUDIS Lindquist   [facility] Care Coordination  Tel: [phone]

## 2021-05-17 ENCOUNTER — IMMUNIZATION (OUTPATIENT)
Dept: NURSING | Facility: CLINIC | Age: 81
End: 2021-05-17
Attending: INTERNAL MEDICINE
Payer: COMMERCIAL

## 2021-05-17 ENCOUNTER — PATIENT OUTREACH (OUTPATIENT)
Dept: CARE COORDINATION | Facility: CLINIC | Age: 81
End: 2021-05-17

## 2021-05-17 PROCEDURE — 91300 PR COVID VAC PFIZER DIL RECON 30 MCG/0.3 ML IM: CPT

## 2021-05-17 PROCEDURE — 0002A PR COVID VAC PFIZER DIL RECON 30 MCG/0.3 ML IM: CPT

## 2021-05-17 NOTE — TELEPHONE ENCOUNTER
Clinic Care Coordination Contact  Program: [place], county benefits, maninder care  County:  [facility] Case #:  [facility] Worker:   Jaciure #:   Subscriber #:   Renewal:  Date Applied:     FRW Outreach:   5/20/21: FRW called patient's daughter to get income update. FRW left  and will make another outreach in 1-2 weeks.   5/17/21: FRW called patient's daughter. Daughter explained her and her spouse claim the mother as a dependent. Daughter is going to get spouse income. Daughters income is $2,720. FRW will call daughter later this week.       Health Insurance:      Referral/Screening:  Is patient requesting help applying for insurance?               Yes- Check mn-its first and then answer the screening questions below   **If pt needs help with a renewal, you do not need to ask the questions below. Send referral to FRW**     Mn-its outcome (insert mn-its here):   (Make sure to enter social security#, PMI#, and change date of service to the year before if needed)      Screening Questions:   1. Have you recently applied for insurance or do for a renewal? If so, when? no  2. How many people in your household? 2  3. Do you file taxes, who do you claim? no  5. What is the monthly gross income for the household (wages, social security, workers comp, and pension)? 0.00           Any other information for FRW?   Maninder care application, would like help with MA        Goals Addressed:

## 2021-05-26 ENCOUNTER — PATIENT OUTREACH (OUTPATIENT)
Dept: CARE COORDINATION | Facility: CLINIC | Age: 81
End: 2021-05-26

## 2021-05-26 NOTE — TELEPHONE ENCOUNTER
Clinic Care Coordination Contact  Program: [place], [facility] benefits, maninder care  County:  [facility] Case #:  [facility] Worker:   Mnsure #:   Subscriber #:   Renewal:  Date Applied:     FRW Outreach:   5/26/21: Daughter called FRW back. Daughter and spouse claim patient on taxes. Monthly income is $3,520. Patient is over income for [place]/[facility] Benefits. Daughter is going to call billing department to make monthly payments.   5/26/21: FRW called patient's daughter to get income update. FRW left VM and will make another outreach in 1-2 weeks.   5/20/21: FRW called patient's daughter to get income update. FRW left VM and will make another outreach in 1-2 weeks.   5/17/21: FRW called patient's daughter. Daughter explained her and her spouse claim the mother as a dependent. Daughter is going to get spouse income. Daughters income is $2,720. FRW will call daughter later this week.       Health Insurance:      Referral/Screening:  Is patient requesting help applying for insurance?               Yes- Check mn-its first and then answer the screening questions below   **If pt needs help with a renewal, you do not need to ask the questions below. Send referral to FRW**     Mn-its outcome (insert mn-its here):   (Make sure to enter social security#, PMI#, and change date of service to the year before if needed)      Screening Questions:   1. Have you recently applied for insurance or do for a renewal? If so, when? no  2. How many people in your household? 2  3. Do you file taxes, who do you claim? no  5. What is the monthly gross income for the household (wages, social security, workers comp, and pension)? 0.00           Any other information for FRW?   Maninder care application, would like help with MA        Goals Addressed:

## 2021-05-26 NOTE — TELEPHONE ENCOUNTER
Clinic Care Coordination Contact  Program: MA, county benefits, maninder care  County:  [facility] Case #:  [facility] Worker:   Mnsure #:   Subscriber #:   Renewal:  Date Applied:     FRW Outreach:   5/26/21: FRW called patient's daughter to get income update. FRW left VM and will make another outreach in 1-2 weeks.   5/20/21: FRW called patient's daughter to get income update. FRW left VM and will make another outreach in 1-2 weeks.   5/17/21: FRW called patient's daughter. Daughter explained her and her spouse claim the mother as a dependent. Daughter is going to get spouse income. Daughters income is $2,720. FRW will call daughter later this week.       Health Insurance:      Referral/Screening:  Is patient requesting help applying for insurance?               Yes- Check mn-its first and then answer the screening questions below   **If pt needs help with a renewal, you do not need to ask the questions below. Send referral to FRW**     Mn-its outcome (insert mn-its here):   (Make sure to enter social security#, PMI#, and change date of service to the year before if needed)      Screening Questions:   1. Have you recently applied for insurance or do for a renewal? If so, when? no  2. How many people in your household? 2  3. Do you file taxes, who do you claim? no  5. What is the monthly gross income for the household (wages, social security, workers comp, and pension)? 0.00           Any other information for FRW?   Maninder care application, would like help with MA        Goals Addressed:

## 2021-06-14 ENCOUNTER — APPOINTMENT (OUTPATIENT)
Dept: INTERPRETER SERVICES | Facility: CLINIC | Age: 81
End: 2021-06-14
Payer: COMMERCIAL

## 2021-06-14 ENCOUNTER — PATIENT OUTREACH (OUTPATIENT)
Dept: CARE COORDINATION | Facility: CLINIC | Age: 81
End: 2021-06-14

## 2021-06-14 NOTE — PROGRESS NOTES
Clinic Care Coordination Contact  [facility] [[email or web address]Voicemail       Clinical Data: Care Coordinator Outreach  Outreach attempted x 1.  Left message on patient's voicemail with call back information and requested return call.  Plan: Care Coordinator will try to reach patient again in 2 weeks.      Nancy Sherwood ARTEM   [facility] Care Coordination  Tel: [phone]

## 2021-06-28 ENCOUNTER — PATIENT OUTREACH (OUTPATIENT)
Dept: CARE COORDINATION | Facility: CLINIC | Age: 81
End: 2021-06-28

## 2021-06-28 NOTE — PROGRESS NOTES
Clinic Care Coordination Contact  [facility]/Voicemail       Clinical Data: Care Coordinator Outreach  Outreach attempted x 2.  Left message on patient's voicemail with call back information and requested return call.  Plan:  Care Coordinator will do no further outreaches at this time.    Nancy Sherwood [facility] Care Coordination  Tel: [phone]

## 2021-07-06 ENCOUNTER — TELEPHONE (OUTPATIENT)
Dept: FAMILY MEDICINE | Facility: CLINIC | Age: 81
End: 2021-07-06

## 2021-07-06 DIAGNOSIS — D64.9 CHRONIC ANEMIA: Primary | ICD-10-CM

## 2021-07-06 DIAGNOSIS — E61.1 IRON DEFICIENCY: ICD-10-CM

## 2021-07-06 DIAGNOSIS — K44.9 HIATAL HERNIA: ICD-10-CM

## 2021-07-06 NOTE — TELEPHONE ENCOUNTER
Call back from Rossana at [facility].  States she spoke to the  that scheduled patient and it was patient initiated with family member acting as an . Asked for both the upper and colonoscopy endoscopies.  Was this the information you needed?  Preethi Recinos RN  [facility]

## 2021-07-06 NOTE — TELEPHONE ENCOUNTER
Please clarify   Did the family want to do the egd and colonoscopy as they were resistant before due  to cost?

## 2021-07-06 NOTE — TELEPHONE ENCOUNTER
Writer called [facility] and spoke with Marquita, who stated she will take message for Spencer regarding Dr. Lozano's question/message and have Harsha call back.  Informed Marquita to have Spencer ask to speak with any available triage nurse.    BOB CmN, RN  [facility]

## 2021-07-06 NOTE — TELEPHONE ENCOUNTER
Reason for Call: Request for an order or referral:    Order or referral being requested: Upper endoscopy  colonoscopy    Date needed: as soon as possible    Has the patient been seen by the PCP for this problem? YES    Additional comments: Patient is requesting orders for these states it has been previously discussed with PCP. Please advise.     Harsha ([facility]) can be called back at [phone]       Call taken on 7/6/2021 at 9:41 AM by Prisca Sanchze

## 2021-07-07 NOTE — TELEPHONE ENCOUNTER
egd and dx colonoscopy signed for [facility] at [place]. Hope this was correct location as no mention of where to put referral to in [facility].

## 2021-07-07 NOTE — TELEPHONE ENCOUNTER
Referral for EGD and Colonoscopy faxed to [facility]: [phone].    BOB CmN, RN  [facility]

## 2021-07-11 DIAGNOSIS — Z11.59 ENCOUNTER FOR SCREENING FOR OTHER VIRAL DISEASES: ICD-10-CM

## 2021-08-13 PROBLEM — Z23 NEED FOR SHINGLES VACCINE: Status: ACTIVE | Noted: 2021-08-13

## 2021-08-13 PROBLEM — E61.1 IRON DEFICIENCY: Status: ACTIVE | Noted: 2021-08-13

## 2021-08-13 PROBLEM — D72.829 LEUKOCYTOSIS, UNSPECIFIED TYPE: Status: ACTIVE | Noted: 2021-08-13

## 2021-08-13 PROBLEM — Z23 NEED FOR DIPHTHERIA-TETANUS-PERTUSSIS (TDAP) VACCINE: Status: ACTIVE | Noted: 2021-08-13

## 2021-09-29 ENCOUNTER — TELEPHONE (OUTPATIENT)
Dept: CARE COORDINATION | Facility: CLINIC | Age: 81
End: 2021-09-29

## 2021-09-29 ENCOUNTER — PATIENT OUTREACH (OUTPATIENT)
Dept: CARE COORDINATION | Facility: CLINIC | Age: 81
End: 2021-09-29

## 2021-09-29 NOTE — PROGRESS NOTES
Patient's daughter called [facility] asking to speak with a SW about medical bills and health insurance. Writer after consulting with [facility] SW called the pt back to schedule an assessment and was unable to reach the pt.   Plan: CHW will attempt to contact the pt again tomorrow.      Akanksha Llanos  Community Health Worker  [facility], [facility]  Ph:[phone]

## 2021-09-30 NOTE — PROGRESS NOTES
Clinic Care Coordination Contact  Community Health Worker Initial Outreach    CHW Initial Information Gathering:  Referral Source: Other, specify  Preferred Hospital: [facility]  [phone]  Preferred Urgent Care: [facility], [phone]  Current living arrangement:: I live in a private home with family  Type of residence:: Private home - stairs  Community Resources: None  Supplies used at home:: None  Equipment Currently Used at Home: none  Informal Support system:: Children, Family  No PCP office visit in Past Year: Yes  Transportation means:: Family       Patient accepts CC: Yes. Patient scheduled for assessment with CCC SW on 10/1/2021 at 3:00pm. Patient noted desire to discuss about medical bills and medical insurance.

## 2021-09-30 NOTE — PROGRESS NOTES
Follow Up Call: Attempt 2 Community Health Worker called and left a message for the patient. Will do no  further outreach at this time.    Akanksha Llanos  Community Health Worker  [facility],   [facility]  Ph:[phone]

## 2021-10-01 ENCOUNTER — PATIENT OUTREACH (OUTPATIENT)
Dept: NURSING | Facility: CLINIC | Age: 81
End: 2021-10-01
Payer: COMMERCIAL

## 2021-10-01 NOTE — PROGRESS NOTES
Clinic Care Coordination Contact    Clinic Care Coordination Contact  OUTREACH    Referral Information:  Referral Source: Other, specify     Patient noted desire to discuss about medical bills and medical insurance.           Chief Complaint   Patient presents with     Clinic Care Coordination - Initial     social work        Universal Utilization:  Clinic Utilization  Difficulty keeping appointments:: No  Compliance Concerns: No  No-Show Concerns: No  No PCP office visit in Past Year: Yes  Utilization    Hospital Admissions  0             ED Visits  0             No Show Count (past year)  2                Current as of: 10/1/2021  4:55 PM              Clinical Concerns:  Current Medical Concerns:    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     Essential hypertension     Hiatal hernia     Hyperlipidemia     Multinodular goiter     Vitamin D deficiency     Chronic anemia     Stage 3 chronic kidney disease, unspecified whether stage 3a or 3b CKD (H)     H/O left bundle branch block     Sensorineural hearing loss (SNHL), unspecified laterality     Cerebrovascular small vessel disease     Dry skin dermatitis     Financial difficulties     Iron deficiency     Leukocytosis, unspecified type     Need for diphtheria-tetanus-pertussis (Tdap) vaccine     Need for shingles vaccine         Current Behavioral Concerns: No behavioral concerns to note. Patient shares her emotional behavioral health is great.      Education Provided to patient: Role of care coordination; discussed medical billing; performed full assessment of needs     Pain  Pain (GOAL):: No  Health Maintenance Reviewed: Due/Overdue There are no preventive care reminders to display for this patient.    Clinical Pathway: None    Medication Management:  Medication review status: Medications  reviewed and no changes reported per patient.        Current Outpatient Medications   Medication Sig Dispense Refill     atorvastatin (LIPITOR) 40 MG tablet TAKE 1 TABLET(40 MG) BY MOUTH AT BEDTIME 90 tablet 0     blood glucose (NO BRAND SPECIFIED) test strip Use to test blood sugar 1 times daily 100 strip 0     calcium carbonate 600 mg-vitamin D 400 units (CALTRATE) 600-400 MG-UNIT per tablet Take 1 tablet by mouth       cholecalciferol 50 MCG (2000 UT) CAPS TAKE 1 CAPSULE BY MOUTH DAILY.       losartan (COZAAR) 50 MG tablet TAKE 1 TABLET(50 MG) BY MOUTH DAILY 90 tablet 1     metFORMIN (GLUCOPHAGE) 850 MG tablet Take 1 tablet (850 mg) by mouth daily (with dinner) 90 tablet 1     mineral oil-hydrophilic petrolatum (AQUAPHOR) external ointment Apply topically as needed Apply to skin neck to feet daily after a shower 60 g 0     thin (NO BRAND SPECIFIED) lancets Use with lanceting device. To accompany: Blood Glucose Monitor Brands: per insurance. 100 each 6     triamcinolone (KENALOG) 0.1 % external ointment Apply topically 2 times daily To flared up itchy skin 2 weeks sparingly as can thin skin 15 g 0     vitamin  s/Minerals TABS Take 1 tablet by mouth            Functional Status:  Dependent ADLs:: Ambulation-walker, Bathing  Dependent IADLs:: Cleaning, Cooking, Laundry  Bed or wheelchair confined:: No  Mobility Status: Independent w/Device  Fallen 2 or more times in the past year?: No  Any fall with injury in the past year?: No    Living Situation:  Current living arrangement:: I live in a private home with family  Type of residence:: Private home - stairs    Lifestyle & Psychosocial Needs:    Social Determinants of Health     Tobacco Use: Low Risk      Smoking Tobacco Use: Never Smoker     Smokeless Tobacco Use: Never Used   Alcohol Use: Not At Risk     Frequency of Alcohol Consumption: Never     Average Number of Drinks: Not on file     Frequency of Binge Drinking: Not on file   Financial Resource Strain:       Difficulty of Paying Living Expenses:    Food Insecurity:      Worried About Running Out of Food in the Last Year:      Ran Out of Food in the Last Year:    Transportation Needs:      Lack of Transportation (Medical):      Lack of Transportation (Non-Medical):    Physical Activity:      Days of Exercise per Week:      Minutes of Exercise per Session:    Stress:      Feeling of Stress :    Social Connections:      Frequency of Communication with Friends and Family:      Frequency of Social Gatherings with Friends and Family:      Attends Taoist Services:      Active Member of Clubs or Organizations:      Attends Club or Organization Meetings:      Marital Status:    Intimate Partner Violence:      Fear of Current or Ex-Partner:      Emotionally Abused:      Physically Abused:      Sexually Abused:    Depression: Not at risk     PHQ-2 Score: 0   Housing Stability:      Unable to Pay for Housing in the Last Year:      Number of Places Lived in the Last Year:      Unstable Housing in the Last Year:      Diet:: Regular  Inadequate nutrition (GOAL):: No  Tube Feeding: No  Inadequate activity/exercise (GOAL):: No  Significant changes in sleep pattern (GOAL): No  Transportation means:: Family     Taoist or spiritual beliefs that impact treatment:: No  Mental health DX:: No  Mental health management concern (GOAL):: No  Chemical Dependency Status: Past Concern  Informal Support system:: Children, Family        [facility] called patient with an Faroese .    Patient shares that she has medical bills from [facility]. She spoke with [facility] and they said they will be sending a form to help patient pay the bill. Patient shares she has not received that yet.    Patient shares she talked with Maryanne from billing at [facility] last Wed. Hasnt received form to help her apply for their maninder care services yet. [facility] encouraged patient to wait a few days as it probably didn't go out in the mail yet.    Patient lives with her  daughter and son in law and they do most of the care taking for patient.    Patient and [facility] completed assessment and medical bills are patients only need.     Resources and Interventions:  Current Resources:      Community Resources: None  Supplies used at home:: None  Equipment Currently Used at Home: none  Employment Status: disabled         Advance Care Plan/Directive  Advanced Care Plans/Directives on file:: No  Advanced Care Plan/Directive Status: Declined Further Information    Referrals Placed: None     Goals:   Goals        General     Financial Wellbeing (pt-stated)      Notes - Note created  10/1/2021  2:55 PM by Nancy Sherwood LSW     Goal Statement: I will apply for [facility] within the next 1-2 weeks if I am eligible.   Date Goal Set:  10/1/21  Strengths: family support  Barriers:  psycho social barriers  Date to Achieve By: 04/1/21     Patient expressed understanding of goal: yes  Action steps to achieve this goal:  1. I understand a referral was placed to the Financial Resource Worker, I will receive a call within the next 3 business days.    2. I understand the financial worker will make two attempts to call me. If I still need help with this goal, I will connect with my Community Health Worker Lisa at [phone]               Patient/Caregiver understanding: Patient verbalized understanding       Future Appointments              In 2 months Needed, , MD [facility]          Plan:   1. Patient to fill out forms sent by billing at [facility] for help with paying bills  2. Patient to contact [facility] in case she has any questions- patient will not enroll with care coordination  3. [facility] to send intro letter to patient.    GERTRUDIS Lindquist   [facility] Care Coordination  Tel: [phone]

## 2021-10-04 ASSESSMENT — ACTIVITIES OF DAILY LIVING (ADL): DEPENDENT_IADLS:: CLEANING;COOKING;LAUNDRY

## 2021-10-05 ENCOUNTER — PATIENT OUTREACH (OUTPATIENT)
Dept: CARE COORDINATION | Facility: CLINIC | Age: 81
End: 2021-10-05

## 2021-10-05 NOTE — PROGRESS NOTES
Clinic Care Coordination Contact  Program: [facility]      FRW Outreach:   10/5/2021: FRW called patient regarding referral, her daughter, Margy, answered the phone and patient was present. FRW completed marissa care form and mailed to patient to sign and return to patient financial services via mail/fax/e-mail. Margy understands and will help her mother with this. FRW will check on status in 3 weeks.    (patient has medical bills through [employer], she will need to complete their own financial aid forms)    Health Insurance:  Private    Referral/Screening:  Insurance:   [place]?  No           Is this an insurance renewal?  No           Is this a new insurance application request?  No           OTHER   Is this a marissa care application?  Yes           Any other information for the FRW?  marissa care  application                 Goals Addressed:

## 2021-10-10 DIAGNOSIS — E11.9 CONTROLLED TYPE 2 DIABETES MELLITUS WITHOUT COMPLICATION, WITHOUT LONG-TERM CURRENT USE OF INSULIN (H): ICD-10-CM

## 2021-10-13 RX ORDER — BLOOD SUGAR DIAGNOSTIC
STRIP MISCELLANEOUS
Qty: 100 STRIP | Refills: 0 | Status: SHIPPED | OUTPATIENT
Start: 2021-10-13

## 2021-10-13 NOTE — TELEPHONE ENCOUNTER
Diabetic Supplies Protocol [id number]/10/2021 03:45 AM   Medication is active on med list Protocol Details    Patient is 18 years of age or older     Recent (6 mo) or future (30 days) visit within the authorizing provider's specialty

## 2021-10-25 ENCOUNTER — PATIENT OUTREACH (OUTPATIENT)
Dept: CARE COORDINATION | Facility: CLINIC | Age: 81
End: 2021-10-25

## 2021-10-25 NOTE — PROGRESS NOTES
Clinic Care Coordination Contact  Program: [facility]      FRARTEM Outreach:   10/25/2021: FRW checked billing notes, patient's daughter is working with the billing department on marissa care application. She spoke with Carter Bernardo on 10/11. Carter e-mailed her. FRW will let daughter continue to work with billing dept on CC erik.   10/5/2021: FRW called patient regarding referral, her daughter, Margy, answered the phone and patient was present. FRW completed marissa care form and mailed to patient to sign and return to patient financial services via mail/fax/e-mail. Margy understands and will help her mother with this. FRW will check on status in 3 weeks.    (patient has medical bills through [facility], she will need to complete their own financial aid forms)    Health Insurance:  Private    Referral/Screening:  Insurance:   [place]?  No           Is this an insurance renewal?  No           Is this a new insurance application request?  No           OTHER   Is this a marissa care application?  Yes           Any other information for the FRW?  marissa care  application                 Goals Addressed:

## 2021-11-17 ENCOUNTER — ALLIED HEALTH/NURSE VISIT (OUTPATIENT)
Dept: FAMILY MEDICINE | Facility: CLINIC | Age: 81
End: 2021-11-17
Payer: COMMERCIAL

## 2021-11-17 DIAGNOSIS — Z23 HIGH PRIORITY FOR 2019-NCOV VACCINE: Primary | ICD-10-CM

## 2021-11-17 PROCEDURE — 0004A COVID-19,PF,PFIZER (12+ YRS): CPT

## 2021-11-17 PROCEDURE — 91300 COVID-19,PF,PFIZER (12+ YRS): CPT

## 2021-11-29 ENCOUNTER — OFFICE VISIT (OUTPATIENT)
Dept: FAMILY MEDICINE | Facility: CLINIC | Age: 81
End: 2021-11-29
Payer: COMMERCIAL

## 2021-11-29 ENCOUNTER — TELEPHONE (OUTPATIENT)
Dept: FAMILY MEDICINE | Facility: CLINIC | Age: 81
End: 2021-11-29

## 2021-11-29 VITALS
OXYGEN SATURATION: 96 % | BODY MASS INDEX: 30.22 KG/M2 | HEART RATE: 92 BPM | HEIGHT: 64 IN | RESPIRATION RATE: 16 BRPM | WEIGHT: 177 LBS | TEMPERATURE: 98.4 F | DIASTOLIC BLOOD PRESSURE: 60 MMHG | SYSTOLIC BLOOD PRESSURE: 126 MMHG

## 2021-11-29 DIAGNOSIS — I10 ESSENTIAL HYPERTENSION: ICD-10-CM

## 2021-11-29 DIAGNOSIS — Z86.79 H/O LEFT BUNDLE BRANCH BLOCK: ICD-10-CM

## 2021-11-29 DIAGNOSIS — E87.1 HYPONATREMIA: ICD-10-CM

## 2021-11-29 DIAGNOSIS — L30.4 ERYTHEMA INTERTRIGO: ICD-10-CM

## 2021-11-29 DIAGNOSIS — E61.1 IRON DEFICIENCY: ICD-10-CM

## 2021-11-29 DIAGNOSIS — Z23 NEED FOR DIPHTHERIA-TETANUS-PERTUSSIS (TDAP) VACCINE: ICD-10-CM

## 2021-11-29 DIAGNOSIS — E11.9 CONTROLLED TYPE 2 DIABETES MELLITUS WITHOUT COMPLICATION, WITHOUT LONG-TERM CURRENT USE OF INSULIN (H): ICD-10-CM

## 2021-11-29 DIAGNOSIS — N18.30 STAGE 3 CHRONIC KIDNEY DISEASE, UNSPECIFIED WHETHER STAGE 3A OR 3B CKD (H): ICD-10-CM

## 2021-11-29 DIAGNOSIS — H61.23 CERUMINOSIS, BILATERAL: ICD-10-CM

## 2021-11-29 DIAGNOSIS — Z01.818 PRE-OPERATIVE GENERAL PHYSICAL EXAMINATION: Primary | ICD-10-CM

## 2021-11-29 DIAGNOSIS — Z23 NEED FOR SHINGLES VACCINE: ICD-10-CM

## 2021-11-29 DIAGNOSIS — K44.9 HIATAL HERNIA: ICD-10-CM

## 2021-11-29 DIAGNOSIS — E78.5 HYPERLIPIDEMIA, UNSPECIFIED HYPERLIPIDEMIA TYPE: ICD-10-CM

## 2021-11-29 DIAGNOSIS — I67.9 CEREBROVASCULAR SMALL VESSEL DISEASE: ICD-10-CM

## 2021-11-29 DIAGNOSIS — Z23 NEED FOR PROPHYLACTIC VACCINATION AND INOCULATION AGAINST INFLUENZA: ICD-10-CM

## 2021-11-29 DIAGNOSIS — M54.50 LEFT-SIDED LOW BACK PAIN WITHOUT SCIATICA, UNSPECIFIED CHRONICITY: ICD-10-CM

## 2021-11-29 DIAGNOSIS — D64.9 CHRONIC ANEMIA: ICD-10-CM

## 2021-11-29 LAB
ALBUMIN UR-MCNC: NEGATIVE MG/DL
APPEARANCE UR: ABNORMAL
BACTERIA #/AREA URNS HPF: ABNORMAL /HPF
BASOPHILS # BLD AUTO: 0.1 10E3/UL (ref 0–0.2)
BASOPHILS NFR BLD AUTO: 1 %
BILIRUB UR QL STRIP: NEGATIVE
COLOR UR AUTO: YELLOW
EOSINOPHIL # BLD AUTO: 0.3 10E3/UL (ref 0–0.7)
EOSINOPHIL NFR BLD AUTO: 2 %
ERYTHROCYTE [DISTWIDTH] IN BLOOD BY AUTOMATED COUNT: 13.1 % (ref 10–15)
GLUCOSE UR STRIP-MCNC: NEGATIVE MG/DL
HBA1C MFR BLD: 7.3 % (ref 0–5.6)
HCT VFR BLD AUTO: 39.2 % (ref 35–47)
HGB BLD-MCNC: 12.4 G/DL (ref 11.7–15.7)
HGB UR QL STRIP: ABNORMAL
IMM GRANULOCYTES # BLD: 0 10E3/UL
IMM GRANULOCYTES NFR BLD: 0 %
KETONES UR STRIP-MCNC: NEGATIVE MG/DL
LEUKOCYTE ESTERASE UR QL STRIP: ABNORMAL
LYMPHOCYTES # BLD AUTO: 2.6 10E3/UL (ref 0.8–5.3)
LYMPHOCYTES NFR BLD AUTO: 20 %
MCH RBC QN AUTO: 28.3 PG (ref 26.5–33)
MCHC RBC AUTO-ENTMCNC: 31.6 G/DL (ref 31.5–36.5)
MCV RBC AUTO: 90 FL (ref 78–100)
MONOCYTES # BLD AUTO: 0.9 10E3/UL (ref 0–1.3)
MONOCYTES NFR BLD AUTO: 7 %
NEUTROPHILS # BLD AUTO: 9.2 10E3/UL (ref 1.6–8.3)
NEUTROPHILS NFR BLD AUTO: 71 %
NITRATE UR QL: NEGATIVE
PH UR STRIP: 5 [PH] (ref 5–7)
PLATELET # BLD AUTO: 329 10E3/UL (ref 150–450)
RBC # BLD AUTO: 4.38 10E6/UL (ref 3.8–5.2)
RBC #/AREA URNS AUTO: ABNORMAL /HPF
SP GR UR STRIP: 1.01 (ref 1–1.03)
SQUAMOUS #/AREA URNS AUTO: ABNORMAL /LPF
UROBILINOGEN UR STRIP-ACNC: 0.2 E.U./DL
WBC # BLD AUTO: 13 10E3/UL (ref 4–11)
WBC #/AREA URNS AUTO: ABNORMAL /HPF

## 2021-11-29 PROCEDURE — 82728 ASSAY OF FERRITIN: CPT | Performed by: FAMILY MEDICINE

## 2021-11-29 PROCEDURE — 83550 IRON BINDING TEST: CPT | Performed by: FAMILY MEDICINE

## 2021-11-29 PROCEDURE — 87186 SC STD MICRODIL/AGAR DIL: CPT | Performed by: FAMILY MEDICINE

## 2021-11-29 PROCEDURE — 85025 COMPLETE CBC W/AUTO DIFF WBC: CPT | Performed by: FAMILY MEDICINE

## 2021-11-29 PROCEDURE — 36415 COLL VENOUS BLD VENIPUNCTURE: CPT | Performed by: FAMILY MEDICINE

## 2021-11-29 PROCEDURE — 99215 OFFICE O/P EST HI 40 MIN: CPT | Mod: 25 | Performed by: FAMILY MEDICINE

## 2021-11-29 PROCEDURE — 93000 ELECTROCARDIOGRAM COMPLETE: CPT | Performed by: FAMILY MEDICINE

## 2021-11-29 PROCEDURE — 83036 HEMOGLOBIN GLYCOSYLATED A1C: CPT | Performed by: FAMILY MEDICINE

## 2021-11-29 PROCEDURE — 87086 URINE CULTURE/COLONY COUNT: CPT | Performed by: FAMILY MEDICINE

## 2021-11-29 PROCEDURE — 80053 COMPREHEN METABOLIC PANEL: CPT | Performed by: FAMILY MEDICINE

## 2021-11-29 PROCEDURE — 81001 URINALYSIS AUTO W/SCOPE: CPT | Performed by: FAMILY MEDICINE

## 2021-11-29 PROCEDURE — 99417 PROLNG OP E/M EACH 15 MIN: CPT | Performed by: FAMILY MEDICINE

## 2021-11-29 RX ORDER — ATORVASTATIN CALCIUM 40 MG/1
40 TABLET, FILM COATED ORAL AT BEDTIME
Qty: 90 TABLET | Refills: 1 | Status: SHIPPED | OUTPATIENT
Start: 2021-11-29 | End: 2022-05-12

## 2021-11-29 RX ORDER — LOSARTAN POTASSIUM 50 MG/1
50 TABLET ORAL DAILY
Qty: 90 TABLET | Refills: 1 | Status: SHIPPED | OUTPATIENT
Start: 2021-11-29 | End: 2022-05-13

## 2021-11-29 RX ORDER — NYSTATIN 100000 U/G
OINTMENT TOPICAL 2 TIMES DAILY
Qty: 30 G | Refills: 0 | Status: SHIPPED | OUTPATIENT
Start: 2021-11-29

## 2021-11-29 ASSESSMENT — MIFFLIN-ST. JEOR: SCORE: 1248.9

## 2021-11-29 NOTE — TELEPHONE ENCOUNTER
Writer called patient with [employer] , #[id number], and reviewed message per Dr. Lozano.    Patient verbalized understanding and in agreement with plan.  BOB CmN, RN  [facility]

## 2021-11-29 NOTE — PATIENT INSTRUCTIONS
Labs and diagnostics today   If stable will clear for surgery otherwise may need additional work up   Take no meds  am of surgery  Hold aspirin, antiinflammatories like motrin aleve etc, fish oil and glucosamine preferable 5 to 7 days prior to surgery  Hold the supp 5 days before  RESUME ALL MEDS AFTER PROCEDURE as ROUTINE  Will fax /send note to surgeon once completed       Preparing for Your Surgery  Getting started  A nurse will call you to review your health history and instructions. They will give you an arrival time based on your scheduled surgery time.  Please be ready to share the following:    Your doctor's clinic name and phone number    Your medical, surgical and anesthesia history    A list of allergies and sensitivities    A list of medicines, including herbal treatments and over-the-counter drugs    Whether the patient has a legal guardian (ask how to send us the papers in advance)  If you have a child who's having surgery, please ask for a copy of Preparing for Your Child's Surgery.    Preparing for surgery    Within 30 days of surgery: Have a pre-op exam (sometimes called an H&P, or History and Physical). This can be done at a clinic or pre-operative center.  ? If you're having a , you may not need this exam. Talk to your care team    At your pre-op exam, talk to your care team about all medicines you take. If you need to stop any medicines before surgery, ask when to start taking them again.  ? We do this for your safety. Many medicines can make you bleed too much during surgery. Some change how well surgery (anesthesia) drugs work.    Call your insurance company to let them know you're having surgery. (If you don't have insurance, call [phone].)    Call your clinic if there's any change in your health. This includes signs of a cold or flu (sore throat, runny nose, cough, rash, fever). It also includes a scrape or scratch near the surgery site.    If you have questions on the day of  surgery, call your hospital or surgery center.  Eating and drinking guidelines  For your safety: Unless your surgeon tells you otherwise, follow the guidelines below.    Eat and drink as usual until 8 hours before surgery. After that, no food or milk.    Drink clear liquids until 2 hours before surgery. These are liquids you can see through, like water, Gatorade and Propel Water. You may also have black coffee and tea (no cream or milk).    Nothing by mouth within 2 hours of surgery. This includes gum, candy and breath mints.    If you drink, stop drinking alcohol the night before surgery.    If your care team tells you to take medicine on the morning of surgery, it's okay to take it with a sip of water.  Preventing infection    Shower or bathe the night before and morning of your surgery. Follow the instructions your clinic gave you. (If no instructions, use regular soap.)    Don't shave or clip hair near your surgery site. We'll remove the hair if needed.    Don't smoke or vape the morning of surgery. You may chew nicotine gum up to 2 hours before surgery. A nicotine patch is okay.  ? Note: Some surgeries require you to completely quit smoking and nicotine. Check with your surgeon.    Your care team will make every effort to keep you safe from infection. We will:  ? Clean our hands often with soap and water (or an alcohol-based hand rub).  ? Clean the skin at your surgery site with a special soap that kills germs.  ? Give you a special gown to keep you warm. (Cold raises the risk of infection.)  ? Wear special hair covers, masks, gowns and gloves during surgery.  ? Give antibiotic medicine, if prescribed. Not all surgeries need antibiotics.  What to bring on the day of surgery    Photo ID and insurance card    Copy of your health care directive, if you have one    Glasses and hearing aides (bring cases)  ? You can't wear contacts during surgery    Inhaler and eye drops, if you use them (tell us about these when  you arrive)    CPAP machine or breathing device, if you use them    A few personal items, if spending the night    If you have . . .  ? A pacemaker or ICD (cardiac defibrillator): Bring the ID card.  ? An implanted stimulator: Bring the remote control.  ? A legal guardian: Bring a copy of the certified (court-stamped) guardianship papers.  Please remove any jewelry, including body piercings. Leave jewelry and other valuables at home.  If you're going home the day of surgery  Important: If you don't follow the rules below, we must cancel your surgery.     Arrange for someone to drive you home after surgery. You may not drive, take a taxi or take public transportation by yourself (unless you'll have local anesthesia only).    Arrange for a responsible adult to stay with you overnight. If you don't, we may keep you in the hospital overnight, and you may need to pay the costs yourself.  Questions?   If you have any questions for your care team, list them here: _________________________________________________________________________________________________________________________________________________________________________________________________________________________________________________________________________________________________________________________  For informational purposes only. Not to replace the advice of your health care provider. Copyright   2003, 2019 [facility]. All rights reserved. Clinically reviewed by Kelly Vincent MD. [employer] 307958 - REV 4/20.

## 2021-11-29 NOTE — RESULT ENCOUNTER NOTE
Seen for preop today   Needs an   Wbc mildly elevated similar to prior  Diabetes mildly worse  On metformin, advised dietary changes  Urine was a bit cloudy, set up for culture   Will see what that shows to make further recomendations

## 2021-11-29 NOTE — PROGRESS NOTES
[facility] [address] [place] [address]  Phone: [phone]  Primary Provider: Chelsea Dominguez  Pre-op Performing Provider:    NED ANTUNEZ LANSA B    PREOPERATIVE EVALUATION:  Today's date: 11/29/2021    Dano Bowser is a 81 year old female who presents for a preoperative evaluation.    Surgical Information:  Surgery/Procedure: ESOPHAGOGASTRODUODENOSCOPY (EGD), COLONOSCOPY  Surgery Location:  GI  Surgeon: Ramsey Posey DO  Surgery Date: 12/3/2021  Time of Surgery: 8:15 AM  Where patient plans to recover: At home with family  Fax number for surgical facility: Note does not need to be faxed, will be available electronically in Epic.    Type of Anesthesia Anticipated: Monitor Anesthesia Care    Assessment & Plan     The proposed surgical procedure is considered INTERMEDIATE risk.    Pre-operative general physical examination  Today for preop for EGD colonoscopy for history of anemia of unknown cause and iron deficiency noted earlier last year.  Cultural and language barriers and finances have limited care.  Does have history of left bundle branch block on EKG unchanged on today's EKG.  Currently asymptomatic.  Did see cardiology but never did get an echo due to cost.  Reports no acute symptoms currently or symptoms with activity.  Did note some left lower back pain after lying on the table a while that improved with sitting up.  CBC today actually shows normal hemoglobin with mildly elevated white count unchanged from prior and a UA that is cloudy and set up for culture.  EKG looks unchanged to prior.  Optimized for procedure as best as possible given circumstances.  Await labs.  If remaining labs and diagnostics come back stable then will have full clearance for surgery advised may need additional work-up.  Advised to take no meds morning of surgery.  Hold supplements 5 days before which they are already doing.  Avoid aspirin anti-inflammatories like Motrin  Aleve fish oil glucosamine 5 to 7 days prior to surgery.  May resume all meds after procedure as routine.  Will send note to the surgeon once completed.  Consider flu shot, Tdap, Shingrix.  Consider diabetes eye check and DEXA scan.  Return in 3 months for Medicare wellness and follow-up on diabetes, pretension, hyperlipidemia etc.  - EKG 12-lead complete w/read - Clinics  - CBC with platelets and differential; Future  - Comprehensive metabolic panel (BMP + Alb, Alk Phos, ALT, AST, Total. Bili, TP); Future  - Hemoglobin A1c; Future  - UA Macro with Reflex to Micro and Culture - lab collect; Future  - Iron and iron binding capacity; Future  - Ferritin; Future  - EKG 12-lead complete w/read - Clinics  - CBC with platelets and differential  - Comprehensive metabolic panel (BMP + Alb, Alk Phos, ALT, AST, Total. Bili, TP)  - Hemoglobin A1c  - UA Macro with Reflex to Micro and Culture - lab collect  - Iron and iron binding capacity  - Ferritin  - Urine Microscopic  - Urine Culture    Iron deficiency  Getting scopes to evaluate the cause of this further.  - CBC with platelets and differential; Future  - Iron and iron binding capacity; Future  - Ferritin; Future  - CBC with platelets and differential  - Iron and iron binding capacity  - Ferritin    Chronic anemia  Seemingly improved on lab work today.  - CBC with platelets and differential; Future  - Iron and iron binding capacity; Future  - Ferritin; Future  - CBC with platelets and differential  - Iron and iron binding capacity  - Ferritin    Hiatal hernia  Reports no symptoms.  Does not mention the warmth in the chest she had previously.  Unclear if she ever tried the Pepcid.  - CBC with platelets and differential; Future  - CBC with platelets and differential    Hyponatremia  Chronic of unclear significance has declined referral to nephrology in the past due to cost.  - Comprehensive metabolic panel (BMP + Alb, Alk Phos, ALT, AST, Total. Bili, TP); Future  - UA Macro  with Reflex to Micro and Culture - lab collect; Future  - Comprehensive metabolic panel (BMP + Alb, Alk Phos, ALT, AST, Total. Bili, TP)  - UA Macro with Reflex to Micro and Culture - lab collect  - Urine Microscopic  - Urine Culture    Stage 3 chronic kidney disease, unspecified whether stage 3a or 3b CKD (H)  Unchanged labs pending.  - CBC with platelets and differential; Future  - Comprehensive metabolic panel (BMP + Alb, Alk Phos, ALT, AST, Total. Bili, TP); Future  - UA Macro with Reflex to Micro and Culture - lab collect; Future  - CBC with platelets and differential  - Comprehensive metabolic panel (BMP + Alb, Alk Phos, ALT, AST, Total. Bili, TP)  - UA Macro with Reflex to Micro and Culture - lab collect  - Urine Microscopic  - Urine Culture    Controlled type 2 diabetes mellitus without complication, without long-term current use of insulin (H)  - Comprehensive metabolic panel (BMP + Alb, Alk Phos, ALT, AST, Total. Bili, TP); Future  - Hemoglobin A1c; Future  - UA Macro with Reflex to Micro and Culture - lab collect; Future  - metFORMIN (GLUCOPHAGE) 850 MG tablet; Take 1 tablet (850 mg) by mouth daily (with dinner)  - Comprehensive metabolic panel (BMP + Alb, Alk Phos, ALT, AST, Total. Bili, TP)  - Hemoglobin A1c  - UA Macro with Reflex to Micro and Culture - lab collect  - Urine Microscopic  - Urine Culture    Essential hypertension  Blood pressure stable on medication.  - EKG 12-lead complete w/read - Clinics  - Comprehensive metabolic panel (BMP + Alb, Alk Phos, ALT, AST, Total. Bili, TP); Future  - UA Macro with Reflex to Micro and Culture - lab collect; Future  - losartan (COZAAR) 50 MG tablet; Take 1 tablet (50 mg) by mouth daily  - Comprehensive metabolic panel (BMP + Alb, Alk Phos, ALT, AST, Total. Bili, TP)  - UA Macro with Reflex to Micro and Culture - lab collect  - Urine Microscopic  - Urine Culture    Cerebrovascular small vessel disease  Asymptomatic.  Daughter denies her having any confusion  though she does all the talking.  - Comprehensive metabolic panel (BMP + Alb, Alk Phos, ALT, AST, Total. Bili, TP); Future  - Comprehensive metabolic panel (BMP + Alb, Alk Phos, ALT, AST, Total. Bili, TP)    Hyperlipidemia, unspecified hyperlipidemia type  Able on medication  - atorvastatin (LIPITOR) 40 MG tablet; Take 1 tablet (40 mg) by mouth At Bedtime    H/O left bundle branch block  EKG shows unchanged left bundle branch block no symptoms.  - EKG 12-lead complete w/read - Clinics    Ceruminosis, bilateral  For bilateral ceruminosis advised Debrox as below  - carbamide peroxide (DEBROX) 6.5 % otic solution; Place 5 drops into both ears 2 times daily for 5 days    Erythema intertrigo  Use nystatin twice a day for 2 weeks, gauze to wick moisture, avoid hydrocortisone cream to this area  - nystatin (MYCOSTATIN) 434224 UNIT/GM external ointment; Apply topically 2 times daily Under breasts bilaterally till resolved    Left-sided low back pain without sciatica, unspecified chronicity  Occurred acutely while in the clinic after lying on the bed for a long time to get her EKG.  SLR negative.  No CVA tenderness.  Gait is normal.  Exam benign.  Improved when she was sitting up.  Continue to monitor.    Need for prophylactic vaccination and inoculation against influenza  Need for diphtheria-tetanus-pertussis (Tdap) vaccine  Need for shingles vaccine  Forgot to offer the flu shot but has been recommended in the past including Tdap and Shingrix.  Will offer again at next appointment.    Risks and Recommendations:  The patient has the following additional risks and recommendations for perioperative complications:   - Consult Hospitalist / IM to assist with post-op medical management  Diabetes:  - Patient is on insulin therapy; diabetic NPO guidelines provided and discussed.  Anemia/Bleeding/Clotting:    - Anemia and does not require treatment prior to surgery. Monitor hemoglobin postoperatively    Medication Instructions:  As  noted in after visit summary and above recommendations    RECOMMENDATION:  APPROVAL GIVEN to proceed with proposed procedure, without further diagnostic evaluation.    Review of the result(s) of each unique test - cbc , ua  Diagnosis or treatment significantly limited by social determinants of health -language and cultural barrier, inefficient  system, limitations of time and resources in the clinic, financial barriers    84 minutes spent on the date of the encounter doing chart review, history and exam, documentation and further activities per the note    Subjective     HPI related to upcoming procedure:   HERE FOR A PREOP FOR EGD AND COLONOSCOPY.  Seen with daughter and help of phone .  Patient really does not say much  All discussion mostly with daughter     Preop Questions 11/29/2021   1. Have you ever had a heart attack or stroke? No   2. Have you ever had surgery on your heart or blood vessels, such as a stent placement, a coronary artery bypass, or surgery on an artery in your head, neck, heart, or legs? No   3. Do you have chest pain with activity? No   4. Do you have a history of  heart failure? No   5. Do you currently have a cold, bronchitis or symptoms of other infection? No   Ok to get a blood transfusion if needed    Health Care Directive:  Patient does not have a Health Care Directive or Living Will: Discussed advance care planning with patient; information given to patient to review.    Preoperative Review of :   reviewed - no record of controlled substances prescribed.    CURRENTLY   Here for a preop for EGd in 3 days ( 12/3) for Hx of abdominal pain, Hiatal hernia, chronic anemia    81-year-old female who speaks Latvian only.  She came to the clinic with her daughter who does not speak English either.  The clinic visit was conducted through a phone  today. Unfortunately, the communication was quite difficult.  5/7/2021 for vomiting chest affecting sleep.  Also  noted to have iron deficiency and anemia that was slightly worsening.  Continued with chronic hyponatremia, stable CKD and noted resolved dizziness and palpitations.  Did have history of hiatal hernia and advised Pepcid twice daily unclear if she ever took that.  Did not return for follow-up in August.  Did not come back for recheck of her labs.  Scheduled for EGD colonoscopy at [facility] in September and then rescheduled this week.  Reports no symptoms currently.  But as she lay on the bed to get her EKG which took a while to get because the EKG machine was not working she reported some left low back pain.  There is no rash, has had pain off and on in the past, it does not radiate down her legs. Denies no fever or chills, no headache or dizziness, no double or blurry vision, no facial pain, earache, sore throat, runny nose, post nasal drip, no trouble hearing, smelling, tasting or swallowing, no cough, no chest pain, trouble breathing or palpitations, No abdominal pain, heart burn, reflux, nausea or vomiting or diarrhea or constipation, no blood in stools or black stools, no weight loss or night sweats. No dysuria, hematuria, frequency, urgency, hesitancy, incontinence, No pelvic complaints. No leg swelling or joint pain. No rash.  Is able to walk a few blocks without getting winded or having chest pain.  Reports sometimes her breathing is noisy but not currently.  We will get her Covid test for surgery tomorrow.  Agreeable to EKG and labs today.  Has held her supplements as of yesterday in anticipation of upcoming procedure.  COVID booster is up-to-date but has not had the flu shot or Tdap or Shingrix.  They are just here to get preop for the procedure.  Has been unable to be seen for diabetes eye check or get a DEXA scan due to cost.  Distal pain high bills and have declined seeing hematology nephrology.  Has history of left bundle branch block on EKG is asymptomatic seen by cardiology in the past and advised  echocardiogram given history of palpitations.  Which has since resolved.  Daughter still paying her bills but hopes once her mother is on government assistance to get an echocardiogram in the new year.    BACKGROUND  81 yr old Syrian lady with hx of DM on metformin( HBA1c 7.3 in 1/2021 in [facility] where getting care to date), HLD on Lipitor, HTN previously on losartan-hydrochlorothiazide now on losartan only, LBBB on EKG, resolved dizziness, vertigo, palpitations, hx of fall, ecoli & klebsiella UTI, hx of urge incontinence, small asymptomatic hiatal hernia noted on CT in 2018 and asymptomatic diverticulum of colon noted at that time too, noted hx of chronic anemia, hx of lactose intolerance, vit d deficiency on calcium and vit d, hx of intermittent hyponatremia secondary to GE in 2018 and dehydration in 1/2021, hx of a right chest wall lipoma s/p excision in 2018, with SNHL supposed to be wearing hearing aids under care of ENT / audiology at [facility], hx of urge incontinence, resolved ceruminous, hx of periodontal disease & tooth extraction of a fractured tooth in nov 2019.  Under care of dentist and hx of allergic conjunctivitis under care of eye, under care of PCP Chelsea Dominguez at [facility] many years, last got labs for her and meds refilled in Jan 2021.     Seen in  first time in [facility] on 2/5/21 for complain of dizziness vertigo, fatigue, hx of a fall & unsteady gait. Was referred to the ER to evaluate for stroke. Seen at [facility] ER for evaluation of dizziness. History limited due to patient's hearing impairment. For the prior 2 days, patient had had dizziness. She stated she got out of bed 2 days  prior and fell. She did not seek medical evaluation at that time. Since that fall, she had had persistent dizziness and  heart pain . She was able to walk but took short steps. No weakness noted in her arms or legs. Had No vision changes. No abdominal pain. No difficulty breathing.  No recent fever or chills. No cough. No other concerns. Noted Mildly hypertensive on arrival otherwise normal vitals. There was very limited ability to obtain a full neurologic exam given that the patient was very hard of hearing and non-English speaking. However, ER did not appreciate any focal neurologic deficits. She clarified  she did not have chest pain just dizziness, and when the doctor was examining her felt her heart was beating fast. Urinalysis shows small amount leukocyte esterase and white cells, however contaminated with mucus. History inconsistent with UTI . Mild leukocytosis, however similar to values obtained last year.  CXR Shallow inspiration. Heart size was normal. Minimal plate like atelectasis at the left lung base. The right lung was clear. No pleural effusions. No pneumothorax. The bones wee demineralized. There was degenerative change in the spine and left shoulder. Sodium(!): 127 Hemoglobin at baseline. Troponin I: 0.03 EKG showed LBBB morphology, sinus rhythm, 2-3mm ST elevation in leads V2-V4, did not meet sgarbossa criteria. No previous EKG for comparison. MRI brain showed no evidence of acute infarction or other acute intracranial abnormality. Findings compatible with moderate small vessel ischemic change. Mild generalized atrophy.   Discussed admission for further workup and management given was still experiencing dizziness, however, patient was adamant that she did not want to stay and wished to leave. Discussed risks and benefits of this decision and they felt she had the capacity to make the decision.      TE done first time 2/8/2021 for hospital discharge follow up and Noted was hydrated with 0.9 % normal saline & Vertigo had resolved, dizziness only when got up suddenly. No longer with palpitations or fatigue. Noted some Urge incontinence & wears diapers. No dysuria, hematuria, frequency, hesitancy, No pelvic complaints. Noted used hydrocortisone for a chronic skin patch. Noted had  been asymptomatic for urinary concerns but ER did do a UA/UC and later Culture grew out: >100,000 klebsiella pneumoniae & > 100,000 e coli, noting that Both were pan sensitive EXCEPT the Klebsiella was resistant to nitrofurantoin. There was a cultural and lingual barrier ( speaks Lithuanian) and an  was utilized. Didn t hear patient speak at all. Daughter did all the talking on the phone. Given Bactrim bid 7 days for an Ecoli and klebsiella UTI noted incidentally when seen in ER for dizziness. Declined seeing neuro, cardio or PT at that time. Noted dizziness had improved and fatigue and palpitations had resolved and gait had improved per daughter who did all the talking on the phone following a hx of fall. In ER noted to have a LBBB with no prior to compare to. Felt hyponatremia seen was due to dehydration. Discussed DM, HTN HLD, multinodular goiter and vit d def briefly & Dm supplies sent in.      Labs done 2/15/21 showed normal Vit d, n/n anemia stable to prior, wbc slightly elevated with no left shift. Normal micro albumin, above normal B 12, normal iron, LFT, electrolytes and sodium improved to 132, TSH was normal. Kidney function was mildly decreased to prior with creatinine of 1.18 and GFR of 50 & HBA1c was goal as was lipids.     Seen first time in person on 2/24/21 in a physical and follow up from the prior visit.   Language and cultural barrier and used a telephone  which was not ideal. Daughter with did most of the talking. Declined mammogram due to covid &   declined dexa scan due to cost. Noted was doing better, had no falls since last seen  Noted was not checking sugars. Daughter reported they asked for test strips, these were sent in but then pharmacy said it was not covered by insurance to send in a whole new script for glucometer kit and strips that would be covered by her insurance. This was sent in but daughter reported that they were going to be charged for that too and felt was  too expensive so it was not picked up and they had not been checking the blood glucose. Was on metformin 850 mg daily. GFR recently mildly decreased but HBA1c was wnl. Had had no lows and tolerating med fine & refill given.    BP was on lower end and creatinine elevated and sodium trending low though improved. Had no more vertigo or positional light headiness & was to await lab results to adjust BP meds.  HLD on atorvastatin. Lipids recently goal & meds refilled. Anemia , noted chronic. They seemed unaware of it.  Reported no known bleeding or bruising from anywhere. Hesitant to see hematology due to expense. Advised to Increase iron rich foods. Recent iron ady. Will check ferritin next time. Could be anemia of chronic disease related to CKD3.   CKD 3, mild worsening on arb, ok to recheck. Hesitant to see nephrology due to cost  For Hyponatremia, noted in past had occurred when dehydrated.  Noted small vessel disease on recent MRI, mini cog ? Accuracy, language, cultural barrier & very deaf not using her hearing aid. Dizziness reported resolved. Declined neuro or PT eval due to cost. Noted generalized weakness, but couldn't describe, that had been going on sometime, got around at home holding on to things, walked short distance on her own. Used daughter as prop to walk. due to cost & time constraints declined PT & further eval of this. Desired DMV form to be filled for handicap parking. She did not drive but daughter drove her everywhere. Never had disability parking tickets before. DMV form filled.   Noted resolved palpitations, hx of dizziness. LBBB noted on EKG in er recently. No old to compare. Declined cardiology consult due to cost.   Hx of hiatal hernia, denied any reflux, blood in stools or black stools Multinodular goiter but had no symptoms, recent TSH wnl. Had completed abxs & had no UTI symptoms. SNHL seen by ENT supposed to be wearing hearing aids.  Reminded to make a dilated eye exam yearly & foot  exam was normal. A care coordinator referral made given cost issues.  Appeared to have dry skin dermatitis & patches of post inflammatory hyperpigmentation. Advised avoiding long hot showers. Try Aquaphor to skin daily after a shower & tac cream only to itchy flared areas twice daily for 2 weeks prn & not to use much as could thin skin & cause hypopigmentation. Advised no med was necessary for post inflammatory hyperpigmented areas as they would resolve on their own with time.   Given the flu & pneumonia vaccines & advised to consider Tdap and shingles another time. Set up to get the covid vaccine.   Was to return in 3 months ( cbc, bmp, ferritin, HBA1c etc) for a recheck or earlier as needed.      2/24/21 telephone done noting sodium remained stable but on lower end at 132  Suspected from diuretic use & advised to change losartan/ hydrochlorothiazide 50 -12.5 to losartan 50 mg alone as BP on low end. Was to monitor BP at home and call if trending consistently above 140/90. Her kidney function had improved back to her baseline & felt Ok to continue metformin safely. Hemoglobin was decreased indicating anemia. But stable to prior & not indicating signs of active bleeding. Thought possibly be from hx of hiatal hernia but reported no gi symptoms, could be chronic from kidney disease related to dm, or from a undiagnosed thalassemia or something else. Family concerned about cost & additional testing & referral to hematology  & GI to evaluate further was declined. Advised could take a multivitamin with iron OTC daily   Referred to the care coordinator but they were unable to get a hold of her. Triage couldn't get a hold of her & letter sent. Seen by cardiology 3/17/21 when noted visit was difficult, BP was normal & advised an echo & ziopatch but not done due to cost. Called on 4/27 for elevated BP & recheck was better & advised to follow up in clinic given prior concerns.    Seen 5/7/21 after 5 months when originally seen  for new concern about sleep. Previously had declined seeing neuro, PT,  for falls and & declined seeing hematology & GI for hx of anemia. Did see cardiology in march and advised an echo and ziopatch at that time which they opted not to do due to cost. Counseled it was  normal to sleep less as one got older. DM and HTN and High cholesterol over time and age could also affect the mind and increase confusion in the evenings. Daughter clarified she was not confused. That sleep was not the actual issue but looking to evaluate the sensation of warmth in chest that disturbed her sleep in the prior few weeks. Could be from GERD/ hiatal hernia, caffeine, low iron or cardiac. Counseled to make sure room is dark, quite and cold to allow for natural restorative sleep. Exercise during the day and staying active to help sleep. Avoid all tea and coffee after 4 pm as caffeine could affect sleep and heart and blood pressure. If oatmeal helped to continue same. was to try melatonin 1 mg bedtime if desired. Labs done to recheck anemia, kidney function, diabetes and sodium. Blood pressure was good, to continue on losartan 50 mg daily. Was to complete covid vaccine on 5/17/21 as planned  & consider getting Tdap and shingles vaccine after that. Diabetes eye check was due & reminded about that. Noted had opted not to do the echo & Holter ordered by cardio bu they understood that cardiology told them every thing was normal. Had had no more falls, dizziness or palpitations. & felt it was not necessary to see neurology & PT. Was hesitant to see any specialist like hematology or GI for anemia or nephrology for hyponatremia or do dexa or mammogram due to cost. Referral to care coordinator made again for hx of financial difficulties. Initially advised to follow up in 6 weeks but then changed to 3 months given cost. Counseled though that recommendations could change based on lab results not back at time of the apt.     Labs came back Friday  night 5/8/21 showing Diabetes remained well controlled. Sodium remained low at 129. Unclear cause. differentials were broad, including SIADH, which itself could be due to numerous reasons including age, medicines, cancer, infection etc.  recommended restricting fluids to no more than 1500 ml total a day ( this would include water, coffee soup, ice creams etc) & recheck sodium in few weeks in a lab only apt but this was never done. Rest of kidney function and other electrolytes were wnl. Explained that I would normally refer to nephrology to further evaluate and treat but daughter reported cost was a big limiting factor. Liver tests showed mild elevated alk phos of unknown cause. CBC showed white count minimally elevated but had no evidence of infection at visit and vitals were normal. Was advised to check temperature & blood sugar at night when reported felt warmth to make sure these were not causes. Also hemoglobin was low at 10.8 so continues with anemia and it was slightly less than 2 months prior & also had evidence of iron deficiency though iron storage from ferritin was still wnl. This suggested she might be loosing blood from some where. Most likely reason in some one her age could be GI losses, could not rule out an ulcer, polyp, cancer etc. She reported no weight loss, blood in stools or black stools. She did mention warmth in chest at night and had a history of hiatal hernia so thought could be a possible source from chronic gastritis & slow loss as was asymptomatic. Normally I would recommend an EGD/ colonoscopy as one step to evaluate cause of anemia. Family said they only wanted me to suggest things that were necessary as cost was a big issue to them and could not afford the health care bills. I felt these suggestions were medically indicated yet I understood that cost was prohibitive. Without knowing what exactly was going on I was limited in how I could appropriately treat her. Advised she try Pepcid  20 mg twice a day to see if would help warmth in chest when lying down and if would help anemia and to take a multivitamin with iron OTC daily to see if would help iron. Also advised to sit upright for 45 min after each meal, avoid eating 3 hrs before bedtime, & decrease spicy foods. Counseled that these measures were just symptomatic care but without knowing the exact cause through further investigations and specialists I was not able to prevent things from possibly getting worse or missing something serious  She was referred to the care coordinator as well due to financial constraints.     She did not come back for recheck of her sodium.  It was unclear if she tried Pepcid or dietary lifestyle measures.  Unclear if the warmth in her chest got better or worse.  No-show to follow-up appointment in August.  Was scheduled for EGD colonoscopy with [place] [facility] in September and then rescheduled to this Friday.    Status of Chronic Conditions:  ANEMIA - Patient has a recent history of moderate-severe anemia, which has not been symptomatic. Work up to date has revealed uppercase noted above.  Treatment was recommended multivitamin with iron but unsure if she ever took this.    DIABETES - Patient has a longstanding history of DiabetesType Type II . Patient is being treated with diet and oral agents and denies significant side effects. Control has been good. Complicating factors include but are not limited to: hypertension, hyperlipidemia, chronic kidney disease and morbid obesity .     HYPERLIPIDEMIA - Patient has a long history of significant Hyperlipidemia requiring medication for treatment with recent good control. Patient reports no problems or side effects with the medication.     HYPERTENSION - Patient has longstanding history of HTN , currently denies any symptoms referable to elevated blood pressure. Specifically denies chest pain, palpitations, dyspnea, orthopnea, PND or peripheral edema. Blood pressure readings  have been in normal range. Current medication regimen is as listed below. Patient denies any side effects of medication.     RENAL INSUFFICIENCY - Patient has a longstanding history of moderate-severe chronic renal insufficiency. Last Cr 0.81 5/7/21    Review of Systems  CONSTITUTIONAL: NEGATIVE for fever, chills, change in weight  INTEGUMENTARY/SKIN: NEGATIVE for worrisome rashes, moles or lesions  EYES: NEGATIVE for vision changes or irritation  ENT/MOUTH: NEGATIVE for ear, mouth and throat problems  RESP: NEGATIVE for significant cough or SOB  CV: NEGATIVE for chest pain, palpitations or peripheral edema  GI: NEGATIVE for nausea, abdominal pain, heartburn, or change in bowel habits  : NEGATIVE for frequency, dysuria, or hematuria  MUSCULOSKELETAL: NEGATIVE for significant arthralgias or myalgia  NEURO: NEGATIVE for weakness, dizziness or paresthesias  ENDOCRINE: NEGATIVE for temperature intolerance, skin/hair changes  HEME: NEGATIVE for bleeding problems  PSYCHIATRIC: NEGATIVE for changes in mood or affect    Patient Active Problem List    Diagnosis Date Noted     Iron deficiency 08/13/2021     Priority: Medium     Leukocytosis, unspecified type 08/13/2021     Priority: Medium     Need for diphtheria-tetanus-pertussis (Tdap) vaccine 08/13/2021     Priority: Medium     Need for shingles vaccine 08/13/2021     Priority: Medium     Financial difficulties 05/08/2021     Priority: Medium     Dry skin dermatitis 05/07/2021     Priority: Medium     Stage 3 chronic kidney disease, unspecified whether stage 3a or 3b CKD (H) 02/24/2021     Priority: Medium     H/O left bundle branch block 02/24/2021     Priority: Medium     Sensorineural hearing loss (SNHL), unspecified laterality 02/24/2021     Priority: Medium     Cerebrovascular small vessel disease 02/24/2021     Priority: Medium     On MRI in er 2021       Chronic anemia 02/08/2021     Priority: Medium     Controlled type 2 diabetes mellitus without complication,  without long-term current use of insulin (H) 06/30/2018     Priority: Medium     Hyponatremia 06/30/2018     Priority: Medium     Assoc c acute gastroenteritis 2018 june       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 06/30/2018     Priority: Medium     Noted on CT scan 6/30/2018       Essential hypertension 06/30/2018     Priority: Medium     Hiatal hernia 06/30/2018     Priority: Medium     Without GERD, small size noted on CT scan 6/30/2018       Hyperlipidemia 06/30/2018     Priority: Medium     Multinodular goiter 06/30/2018     Priority: Medium     Vitamin D deficiency 06/30/2018     Priority: Medium      Past Medical History:   Diagnosis Date     Acute hyponatremia 06/30/2018    Assoc c acute gastroenteritis 2018 june     anemia     mild     Benign essential hypertension      Diabetes mellitus (H)      Mass of chest wall, right 06/30/2018    Lipoma? Not seen on ct of 6 18, s/p removal by surgeon      Past Surgical History:   Procedure Laterality Date     SURGICAL PATHOLOGY EXAM Right 2018    chest wall     Current Outpatient Medications   Medication Sig Dispense Refill     atorvastatin (LIPITOR) 40 MG tablet TAKE 1 TABLET(40 MG) BY MOUTH AT BEDTIME 90 tablet 0     blood glucose (ONETOUCH VERIO IQ) test strip TEST BLOOD SUGAR ONCE DAILY 100 strip 0     calcium carbonate 600 mg-vitamin D 400 units (CALTRATE) 600-400 MG-UNIT per tablet Take 1 tablet by mouth       cholecalciferol 50 MCG (2000 UT) CAPS TAKE 1 CAPSULE BY MOUTH DAILY.       losartan (COZAAR) 50 MG tablet TAKE 1 TABLET(50 MG) BY MOUTH DAILY 90 tablet 0     metFORMIN (GLUCOPHAGE) 850 MG tablet Take 1 tablet (850 mg) by mouth daily (with dinner) 90 tablet 1     mineral oil-hydrophilic petrolatum (AQUAPHOR) external ointment Apply topically as needed Apply to skin neck to feet daily after a shower 60 g 0     thin (NO BRAND SPECIFIED) lancets Use with lanceting device. To accompany: Blood Glucose Monitor Brands: per insurance. 100 each 6      "triamcinolone (KENALOG) 0.1 % external ointment Apply topically 2 times daily To flared up itchy skin 2 weeks sparingly as can thin skin 15 g 0     vitamin  s/Minerals TABS Take 1 tablet by mouth         Allergies   Allergen Reactions     Lactose Diarrhea        Social History     Tobacco Use     Smoking status: Never Smoker     Smokeless tobacco: Never Used   Substance Use Topics     Alcohol use: Never     No family history on file.  History   Drug Use Unknown         Objective     /60 (BP Location: Right arm, Patient Position: Chair, Cuff Size: Adult Large)   Pulse 92   Temp 98.4  F (36.9  C) (Temporal)   Resp 16   Ht 1.619 m (5' 3.75\")   Wt 80.3 kg (177 lb)   LMP  (LMP Unknown)   SpO2 96%   BMI 30.62 kg/m      Physical Exam    GENERAL APPEARANCE: healthy, alert, active, no distress, cooperative and obese     EYES: EOMI, PERRL     HENT: nose and mouth without ulcers or lesions, cerumen bilateral and normal cephalic/atraumatic     NECK: no adenopathy, no asymmetry, masses, or scars and thyroid prominent but normal to palpation     RESP: lungs clear to auscultation - no rales, rhonchi or wheezes     CV: regular rates and rhythm, normal S1 S2, no S3 or S4 and no murmur, click or rub     ABDOMEN:  soft, non tender, no HSM or masses and bowel sounds normal, no CVA tenderness     MS: extremities normal- no gross deformities noted, no evidence of inflammation in joints, FROM in all extremities.     SKIN: no suspicious lesions or rashes, tattoos, noted to have erythema in the intertriginous area  inframammary     NEURO: Normal strength and tone, sensory exam grossly normal, mentation intact and speech normal     PSYCH: mentation appears normal. and affect normal/bright     LYMPHATICS: No cervical adenopathy    Recent Labs   Lab Test 05/07/21  1607 02/24/21  1140 02/15/21  1135   HGB 10.8* 11.3* 10.9*    378 378   * 132* 132*   POTASSIUM 5.0 4.3 4.7   CR 0.81 0.79 1.18*   A1C 6.4*  --  6.3*    "     Diagnostics:  Recent Results (from the past 24 hour(s))   Hemoglobin A1c    Collection Time: 11/29/21 12:22 PM   Result Value Ref Range    Hemoglobin A1C 7.3 (H) 0.0 - 5.6 %   UA Macro with Reflex to Micro and Culture - lab collect    Collection Time: 11/29/21 12:22 PM    Specimen: Urine, Midstream   Result Value Ref Range    Color Urine Yellow Colorless, Straw, Light Yellow, Yellow    Appearance Urine Slightly Cloudy (A) Clear    Glucose Urine Negative Negative mg/dL    Bilirubin Urine Negative Negative    Ketones Urine Negative Negative mg/dL    Specific Gravity Urine 1.015 1.003 - 1.035    Blood Urine Trace (A) Negative    pH Urine 5.0 5.0 - 7.0    Protein Albumin Urine Negative Negative mg/dL    Urobilinogen Urine 0.2 0.2, 1.0 E.U./dL    Nitrite Urine Negative Negative    Leukocyte Esterase Urine Small (A) Negative   CBC with platelets and differential    Collection Time: 11/29/21 12:22 PM   Result Value Ref Range    WBC Count 13.0 (H) 4.0 - 11.0 10e3/uL    RBC Count 4.38 3.80 - 5.20 10e6/uL    Hemoglobin 12.4 11.7 - 15.7 g/dL    Hematocrit 39.2 35.0 - 47.0 %    MCV 90 78 - 100 fL    MCH 28.3 26.5 - 33.0 pg    MCHC 31.6 31.5 - 36.5 g/dL    RDW 13.1 10.0 - 15.0 %    Platelet Count 329 150 - 450 10e3/uL    % Neutrophils 71 %    % Lymphocytes 20 %    % Monocytes 7 %    % Eosinophils 2 %    % Basophils 1 %    % Immature Granulocytes 0 %    Absolute Neutrophils 9.2 (H) 1.6 - 8.3 10e3/uL    Absolute Lymphocytes 2.6 0.8 - 5.3 10e3/uL    Absolute Monocytes 0.9 0.0 - 1.3 10e3/uL    Absolute Eosinophils 0.3 0.0 - 0.7 10e3/uL    Absolute Basophils 0.1 0.0 - 0.2 10e3/uL    Absolute Immature Granulocytes 0.0 <=0.0 10e3/uL   Urine Microscopic    Collection Time: 11/29/21 12:22 PM   Result Value Ref Range    Bacteria Urine Moderate (A) None Seen /HPF    RBC Urine None Seen 0-2 /HPF /HPF    WBC Urine 10-25 (A) 0-5 /HPF /HPF    Squamous Epithelials Urine Moderate (A) None Seen /LPF      EKG: Left Bundle Branch Block,  unchanged from previous tracings    EKG shows LBBB unchanged from prior  No symptoms  Had declined echo previously due to cost  ( high bills)     Revised Cardiac Risk Index (RCRI):  The patient has the following serious cardiovascular risks for perioperative complications:   - LBBB ? Coronary Artery Disease (MI, positive stress test, angina, Qs on EKG) = 1 point     RCRI Interpretation: 1 point: Class II (low risk - 0.9% complication rate)         Signed Electronically by: Cee Lozano MD  Copy of this evaluation report is provided to requesting physician.

## 2021-11-29 NOTE — TELEPHONE ENCOUNTER
----- Message from Cee Lozano MD sent at 11/29/2021  3:54 PM CST -----  Seen for preop today   Needs an   Wbc mildly elevated similar to prior  Diabetes mildly worse  On metformin, advised dietary changes  Urine was a bit cloudy, set up for culture   Will see what that shows to make further recomendations

## 2021-11-29 NOTE — H&P (VIEW-ONLY)
[facility] [address] [place] [address]  Phone: [phone]  Primary Provider: Chelsea Dominguez  Pre-op Performing Provider:    NED ANTUNEZ LANSA B    PREOPERATIVE EVALUATION:  Today's date: 11/29/2021    Dano Bowser is a 81 year old female who presents for a preoperative evaluation.    Surgical Information:  Surgery/Procedure: ESOPHAGOGASTRODUODENOSCOPY (EGD), COLONOSCOPY  Surgery Location:  GI  Surgeon: Ramsey Posey DO  Surgery Date: 12/3/2021  Time of Surgery: 8:15 AM  Where patient plans to recover: At home with family  Fax number for surgical facility: Note does not need to be faxed, will be available electronically in Epic.    Type of Anesthesia Anticipated: Monitor Anesthesia Care    Assessment & Plan     The proposed surgical procedure is considered INTERMEDIATE risk.    Pre-operative general physical examination  Today for preop for EGD colonoscopy for history of anemia of unknown cause and iron deficiency noted earlier last year.  Cultural and language barriers and finances have limited care.  Does have history of left bundle branch block on EKG unchanged on today's EKG.  Currently asymptomatic.  Did see cardiology but never did get an echo due to cost.  Reports no acute symptoms currently or symptoms with activity.  Did note some left lower back pain after lying on the table a while that improved with sitting up.  CBC today actually shows normal hemoglobin with mildly elevated white count unchanged from prior and a UA that is cloudy and set up for culture.  EKG looks unchanged to prior.  Optimized for procedure as best as possible given circumstances.  Await labs.  If remaining labs and diagnostics come back stable then will have full clearance for surgery advised may need additional work-up.  Advised to take no meds morning of surgery.  Hold supplements 5 days before which they are already doing.  Avoid aspirin anti-inflammatories like Motrin  Aleve fish oil glucosamine 5 to 7 days prior to surgery.  May resume all meds after procedure as routine.  Will send note to the surgeon once completed.  Consider flu shot, Tdap, Shingrix.  Consider diabetes eye check and DEXA scan.  Return in 3 months for Medicare wellness and follow-up on diabetes, pretension, hyperlipidemia etc.  - EKG 12-lead complete w/read - Clinics  - CBC with platelets and differential; Future  - Comprehensive metabolic panel (BMP + Alb, Alk Phos, ALT, AST, Total. Bili, TP); Future  - Hemoglobin A1c; Future  - UA Macro with Reflex to Micro and Culture - lab collect; Future  - Iron and iron binding capacity; Future  - Ferritin; Future  - EKG 12-lead complete w/read - Clinics  - CBC with platelets and differential  - Comprehensive metabolic panel (BMP + Alb, Alk Phos, ALT, AST, Total. Bili, TP)  - Hemoglobin A1c  - UA Macro with Reflex to Micro and Culture - lab collect  - Iron and iron binding capacity  - Ferritin  - Urine Microscopic  - Urine Culture    Iron deficiency  Getting scopes to evaluate the cause of this further.  - CBC with platelets and differential; Future  - Iron and iron binding capacity; Future  - Ferritin; Future  - CBC with platelets and differential  - Iron and iron binding capacity  - Ferritin    Chronic anemia  Seemingly improved on lab work today.  - CBC with platelets and differential; Future  - Iron and iron binding capacity; Future  - Ferritin; Future  - CBC with platelets and differential  - Iron and iron binding capacity  - Ferritin    Hiatal hernia  Reports no symptoms.  Does not mention the warmth in the chest she had previously.  Unclear if she ever tried the Pepcid.  - CBC with platelets and differential; Future  - CBC with platelets and differential    Hyponatremia  Chronic of unclear significance has declined referral to nephrology in the past due to cost.  - Comprehensive metabolic panel (BMP + Alb, Alk Phos, ALT, AST, Total. Bili, TP); Future  - UA Macro  with Reflex to Micro and Culture - lab collect; Future  - Comprehensive metabolic panel (BMP + Alb, Alk Phos, ALT, AST, Total. Bili, TP)  - UA Macro with Reflex to Micro and Culture - lab collect  - Urine Microscopic  - Urine Culture    Stage 3 chronic kidney disease, unspecified whether stage 3a or 3b CKD (H)  Unchanged labs pending.  - CBC with platelets and differential; Future  - Comprehensive metabolic panel (BMP + Alb, Alk Phos, ALT, AST, Total. Bili, TP); Future  - UA Macro with Reflex to Micro and Culture - lab collect; Future  - CBC with platelets and differential  - Comprehensive metabolic panel (BMP + Alb, Alk Phos, ALT, AST, Total. Bili, TP)  - UA Macro with Reflex to Micro and Culture - lab collect  - Urine Microscopic  - Urine Culture    Controlled type 2 diabetes mellitus without complication, without long-term current use of insulin (H)  - Comprehensive metabolic panel (BMP + Alb, Alk Phos, ALT, AST, Total. Bili, TP); Future  - Hemoglobin A1c; Future  - UA Macro with Reflex to Micro and Culture - lab collect; Future  - metFORMIN (GLUCOPHAGE) 850 MG tablet; Take 1 tablet (850 mg) by mouth daily (with dinner)  - Comprehensive metabolic panel (BMP + Alb, Alk Phos, ALT, AST, Total. Bili, TP)  - Hemoglobin A1c  - UA Macro with Reflex to Micro and Culture - lab collect  - Urine Microscopic  - Urine Culture    Essential hypertension  Blood pressure stable on medication.  - EKG 12-lead complete w/read - Clinics  - Comprehensive metabolic panel (BMP + Alb, Alk Phos, ALT, AST, Total. Bili, TP); Future  - UA Macro with Reflex to Micro and Culture - lab collect; Future  - losartan (COZAAR) 50 MG tablet; Take 1 tablet (50 mg) by mouth daily  - Comprehensive metabolic panel (BMP + Alb, Alk Phos, ALT, AST, Total. Bili, TP)  - UA Macro with Reflex to Micro and Culture - lab collect  - Urine Microscopic  - Urine Culture    Cerebrovascular small vessel disease  Asymptomatic.  Daughter denies her having any confusion  though she does all the talking.  - Comprehensive metabolic panel (BMP + Alb, Alk Phos, ALT, AST, Total. Bili, TP); Future  - Comprehensive metabolic panel (BMP + Alb, Alk Phos, ALT, AST, Total. Bili, TP)    Hyperlipidemia, unspecified hyperlipidemia type  Able on medication  - atorvastatin (LIPITOR) 40 MG tablet; Take 1 tablet (40 mg) by mouth At Bedtime    H/O left bundle branch block  EKG shows unchanged left bundle branch block no symptoms.  - EKG 12-lead complete w/read - Clinics    Ceruminosis, bilateral  For bilateral ceruminosis advised Debrox as below  - carbamide peroxide (DEBROX) 6.5 % otic solution; Place 5 drops into both ears 2 times daily for 5 days    Erythema intertrigo  Use nystatin twice a day for 2 weeks, gauze to wick moisture, avoid hydrocortisone cream to this area  - nystatin (MYCOSTATIN) 205866 UNIT/GM external ointment; Apply topically 2 times daily Under breasts bilaterally till resolved    Left-sided low back pain without sciatica, unspecified chronicity  Occurred acutely while in the clinic after lying on the bed for a long time to get her EKG.  SLR negative.  No CVA tenderness.  Gait is normal.  Exam benign.  Improved when she was sitting up.  Continue to monitor.    Need for prophylactic vaccination and inoculation against influenza  Need for diphtheria-tetanus-pertussis (Tdap) vaccine  Need for shingles vaccine  Forgot to offer the flu shot but has been recommended in the past including Tdap and Shingrix.  Will offer again at next appointment.    Risks and Recommendations:  The patient has the following additional risks and recommendations for perioperative complications:   - Consult Hospitalist / IM to assist with post-op medical management  Diabetes:  - Patient is on insulin therapy; diabetic NPO guidelines provided and discussed.  Anemia/Bleeding/Clotting:    - Anemia and does not require treatment prior to surgery. Monitor hemoglobin postoperatively    Medication Instructions:  As  noted in after visit summary and above recommendations    RECOMMENDATION:  APPROVAL GIVEN to proceed with proposed procedure, without further diagnostic evaluation.    Review of the result(s) of each unique test - cbc , ua  Diagnosis or treatment significantly limited by social determinants of health -language and cultural barrier, inefficient  system, limitations of time and resources in the clinic, financial barriers    84 minutes spent on the date of the encounter doing chart review, history and exam, documentation and further activities per the note    Subjective     HPI related to upcoming procedure:   HERE FOR A PREOP FOR EGD AND COLONOSCOPY.  Seen with daughter and help of phone .  Patient really does not say much  All discussion mostly with daughter     Preop Questions 11/29/2021   1. Have you ever had a heart attack or stroke? No   2. Have you ever had surgery on your heart or blood vessels, such as a stent placement, a coronary artery bypass, or surgery on an artery in your head, neck, heart, or legs? No   3. Do you have chest pain with activity? No   4. Do you have a history of  heart failure? No   5. Do you currently have a cold, bronchitis or symptoms of other infection? No   Ok to get a blood transfusion if needed    Health Care Directive:  Patient does not have a Health Care Directive or Living Will: Discussed advance care planning with patient; information given to patient to review.    Preoperative Review of :   reviewed - no record of controlled substances prescribed.    CURRENTLY   Here for a preop for EGd in 3 days ( 12/3) for Hx of abdominal pain, Hiatal hernia, chronic anemia    81-year-old female who speaks Bulgarian only.  She came to the clinic with her daughter who does not speak English either.  The clinic visit was conducted through a phone  today. Unfortunately, the communication was quite difficult.  5/7/2021 for vomiting chest affecting sleep.  Also  noted to have iron deficiency and anemia that was slightly worsening.  Continued with chronic hyponatremia, stable CKD and noted resolved dizziness and palpitations.  Did have history of hiatal hernia and advised Pepcid twice daily unclear if she ever took that.  Did not return for follow-up in August.  Did not come back for recheck of her labs.  Scheduled for EGD colonoscopy at [facility] in September and then rescheduled this week.  Reports no symptoms currently.  But as she lay on the bed to get her EKG which took a while to get because the EKG machine was not working she reported some left low back pain.  There is no rash, has had pain off and on in the past, it does not radiate down her legs. Denies no fever or chills, no headache or dizziness, no double or blurry vision, no facial pain, earache, sore throat, runny nose, post nasal drip, no trouble hearing, smelling, tasting or swallowing, no cough, no chest pain, trouble breathing or palpitations, No abdominal pain, heart burn, reflux, nausea or vomiting or diarrhea or constipation, no blood in stools or black stools, no weight loss or night sweats. No dysuria, hematuria, frequency, urgency, hesitancy, incontinence, No pelvic complaints. No leg swelling or joint pain. No rash.  Is able to walk a few blocks without getting winded or having chest pain.  Reports sometimes her breathing is noisy but not currently.  We will get her Covid test for surgery tomorrow.  Agreeable to EKG and labs today.  Has held her supplements as of yesterday in anticipation of upcoming procedure.  COVID booster is up-to-date but has not had the flu shot or Tdap or Shingrix.  They are just here to get preop for the procedure.  Has been unable to be seen for diabetes eye check or get a DEXA scan due to cost.  Distal pain high bills and have declined seeing hematology nephrology.  Has history of left bundle branch block on EKG is asymptomatic seen by cardiology in the past and advised  echocardiogram given history of palpitations.  Which has since resolved.  Daughter still paying her bills but hopes once her mother is on government assistance to get an echocardiogram in the new year.    BACKGROUND  81 yr old Citizen of [place] lady with hx of DM on metformin( HBA1c 7.3 in 1/2021 in [facility] where getting care to date), HLD on Lipitor, HTN previously on losartan-hydrochlorothiazide now on losartan only, LBBB on EKG, resolved dizziness, vertigo, palpitations, hx of fall, ecoli & klebsiella UTI, hx of urge incontinence, small asymptomatic hiatal hernia noted on CT in 2018 and asymptomatic diverticulum of colon noted at that time too, noted hx of chronic anemia, hx of lactose intolerance, vit d deficiency on calcium and vit d, hx of intermittent hyponatremia secondary to GE in 2018 and dehydration in 1/2021, hx of a right chest wall lipoma s/p excision in 2018, with SNHL supposed to be wearing hearing aids under care of ENT / audiology at [facility] Dat, hx of urge incontinence, resolved ceruminous, hx of periodontal disease & tooth extraction of a fractured tooth in nov 2019.  Under care of dentist and hx of allergic conjunctivitis under care of eye, under care of PCP Chelsea Dominguez at [facility] many years, last got labs for her and meds refilled in Jan 2021.     Seen in  first time in [facility] on 2/5/21 for complain of dizziness vertigo, fatigue, hx of a fall & unsteady gait. Was referred to the ER to evaluate for stroke. Seen at [facility] ER for evaluation of dizziness. History limited due to patient's hearing impairment. For the prior 2 days, patient had had dizziness. She stated she got out of bed 2 days  prior and fell. She did not seek medical evaluation at that time. Since that fall, she had had persistent dizziness and  heart pain . She was able to walk but took short steps. No weakness noted in her arms or legs. Had No vision changes. No abdominal pain. No difficulty breathing.  No recent fever or chills. No cough. No other concerns. Noted Mildly hypertensive on arrival otherwise normal vitals. There was very limited ability to obtain a full neurologic exam given that the patient was very hard of hearing and non-English speaking. However, ER did not appreciate any focal neurologic deficits. She clarified  she did not have chest pain just dizziness, and when the doctor was examining her felt her heart was beating fast. Urinalysis shows small amount leukocyte esterase and white cells, however contaminated with mucus. History inconsistent with UTI . Mild leukocytosis, however similar to values obtained last year.  CXR Shallow inspiration. Heart size was normal. Minimal plate like atelectasis at the left lung base. The right lung was clear. No pleural effusions. No pneumothorax. The bones wee demineralized. There was degenerative change in the spine and left shoulder. Sodium(!): 127 Hemoglobin at baseline. Troponin I: 0.03 EKG showed LBBB morphology, sinus rhythm, 2-3mm ST elevation in leads V2-V4, did not meet sgarbossa criteria. No previous EKG for comparison. MRI brain showed no evidence of acute infarction or other acute intracranial abnormality. Findings compatible with moderate small vessel ischemic change. Mild generalized atrophy.   Discussed admission for further workup and management given was still experiencing dizziness, however, patient was adamant that she did not want to stay and wished to leave. Discussed risks and benefits of this decision and they felt she had the capacity to make the decision.      TE done first time 2/8/2021 for hospital discharge follow up and Noted was hydrated with 0.9 % normal saline & Vertigo had resolved, dizziness only when got up suddenly. No longer with palpitations or fatigue. Noted some Urge incontinence & wears diapers. No dysuria, hematuria, frequency, hesitancy, No pelvic complaints. Noted used hydrocortisone for a chronic skin patch. Noted had  been asymptomatic for urinary concerns but ER did do a UA/UC and later Culture grew out: >100,000 klebsiella pneumoniae & > 100,000 e coli, noting that Both were pan sensitive EXCEPT the Klebsiella was resistant to nitrofurantoin. There was a cultural and lingual barrier ( speaks Japanese) and an  was utilized. Didn t hear patient speak at all. Daughter did all the talking on the phone. Given Bactrim bid 7 days for an Ecoli and klebsiella UTI noted incidentally when seen in ER for dizziness. Declined seeing neuro, cardio or PT at that time. Noted dizziness had improved and fatigue and palpitations had resolved and gait had improved per daughter who did all the talking on the phone following a hx of fall. In ER noted to have a LBBB with no prior to compare to. Felt hyponatremia seen was due to dehydration. Discussed DM, HTN HLD, multinodular goiter and vit d def briefly & Dm supplies sent in.      Labs done 2/15/21 showed normal Vit d, n/n anemia stable to prior, wbc slightly elevated with no left shift. Normal micro albumin, above normal B 12, normal iron, LFT, electrolytes and sodium improved to 132, TSH was normal. Kidney function was mildly decreased to prior with creatinine of 1.18 and GFR of 50 & HBA1c was goal as was lipids.     Seen first time in person on 2/24/21 in a physical and follow up from the prior visit.   Language and cultural barrier and used a telephone  which was not ideal. Daughter with did most of the talking. Declined mammogram due to covid &   declined dexa scan due to cost. Noted was doing better, had no falls since last seen  Noted was not checking sugars. Daughter reported they asked for test strips, these were sent in but then pharmacy said it was not covered by insurance to send in a whole new script for glucometer kit and strips that would be covered by her insurance. This was sent in but daughter reported that they were going to be charged for that too and felt was  too expensive so it was not picked up and they had not been checking the blood glucose. Was on metformin 850 mg daily. GFR recently mildly decreased but HBA1c was wnl. Had had no lows and tolerating med fine & refill given.    BP was on lower end and creatinine elevated and sodium trending low though improved. Had no more vertigo or positional light headiness & was to await lab results to adjust BP meds.  HLD on atorvastatin. Lipids recently goal & meds refilled. Anemia , noted chronic. They seemed unaware of it.  Reported no known bleeding or bruising from anywhere. Hesitant to see hematology due to expense. Advised to Increase iron rich foods. Recent iron ady. Will check ferritin next time. Could be anemia of chronic disease related to CKD3.   CKD 3, mild worsening on arb, ok to recheck. Hesitant to see nephrology due to cost  For Hyponatremia, noted in past had occurred when dehydrated.  Noted small vessel disease on recent MRI, mini cog ? Accuracy, language, cultural barrier & very deaf not using her hearing aid. Dizziness reported resolved. Declined neuro or PT eval due to cost. Noted generalized weakness, but couldn't describe, that had been going on sometime, got around at home holding on to things, walked short distance on her own. Used daughter as prop to walk. due to cost & time constraints declined PT & further eval of this. Desired DMV form to be filled for handicap parking. She did not drive but daughter drove her everywhere. Never had disability parking tickets before. DMV form filled.   Noted resolved palpitations, hx of dizziness. LBBB noted on EKG in er recently. No old to compare. Declined cardiology consult due to cost.   Hx of hiatal hernia, denied any reflux, blood in stools or black stools Multinodular goiter but had no symptoms, recent TSH wnl. Had completed abxs & had no UTI symptoms. SNHL seen by ENT supposed to be wearing hearing aids.  Reminded to make a dilated eye exam yearly & foot  exam was normal. A care coordinator referral made given cost issues.  Appeared to have dry skin dermatitis & patches of post inflammatory hyperpigmentation. Advised avoiding long hot showers. Try Aquaphor to skin daily after a shower & tac cream only to itchy flared areas twice daily for 2 weeks prn & not to use much as could thin skin & cause hypopigmentation. Advised no med was necessary for post inflammatory hyperpigmented areas as they would resolve on their own with time.   Given the flu & pneumonia vaccines & advised to consider Tdap and shingles another time. Set up to get the covid vaccine.   Was to return in 3 months ( cbc, bmp, ferritin, HBA1c etc) for a recheck or earlier as needed.      2/24/21 telephone done noting sodium remained stable but on lower end at 132  Suspected from diuretic use & advised to change losartan/ hydrochlorothiazide 50 -12.5 to losartan 50 mg alone as BP on low end. Was to monitor BP at home and call if trending consistently above 140/90. Her kidney function had improved back to her baseline & felt Ok to continue metformin safely. Hemoglobin was decreased indicating anemia. But stable to prior & not indicating signs of active bleeding. Thought possibly be from hx of hiatal hernia but reported no gi symptoms, could be chronic from kidney disease related to dm, or from a undiagnosed thalassemia or something else. Family concerned about cost & additional testing & referral to hematology  & GI to evaluate further was declined. Advised could take a multivitamin with iron OTC daily   Referred to the care coordinator but they were unable to get a hold of her. Triage couldn't get a hold of her & letter sent. Seen by cardiology 3/17/21 when noted visit was difficult, BP was normal & advised an echo & ziopatch but not done due to cost. Called on 4/27 for elevated BP & recheck was better & advised to follow up in clinic given prior concerns.    Seen 5/7/21 after 5 months when originally seen  for new concern about sleep. Previously had declined seeing neuro, PT,  for falls and & declined seeing hematology & GI for hx of anemia. Did see cardiology in march and advised an echo and ziopatch at that time which they opted not to do due to cost. Counseled it was  normal to sleep less as one got older. DM and HTN and High cholesterol over time and age could also affect the mind and increase confusion in the evenings. Daughter clarified she was not confused. That sleep was not the actual issue but looking to evaluate the sensation of warmth in chest that disturbed her sleep in the prior few weeks. Could be from GERD/ hiatal hernia, caffeine, low iron or cardiac. Counseled to make sure room is dark, quite and cold to allow for natural restorative sleep. Exercise during the day and staying active to help sleep. Avoid all tea and coffee after 4 pm as caffeine could affect sleep and heart and blood pressure. If oatmeal helped to continue same. was to try melatonin 1 mg bedtime if desired. Labs done to recheck anemia, kidney function, diabetes and sodium. Blood pressure was good, to continue on losartan 50 mg daily. Was to complete covid vaccine on 5/17/21 as planned  & consider getting Tdap and shingles vaccine after that. Diabetes eye check was due & reminded about that. Noted had opted not to do the echo & Holter ordered by cardio bu they understood that cardiology told them every thing was normal. Had had no more falls, dizziness or palpitations. & felt it was not necessary to see neurology & PT. Was hesitant to see any specialist like hematology or GI for anemia or nephrology for hyponatremia or do dexa or mammogram due to cost. Referral to care coordinator made again for hx of financial difficulties. Initially advised to follow up in 6 weeks but then changed to 3 months given cost. Counseled though that recommendations could change based on lab results not back at time of the apt.     Labs came back Friday  night 5/8/21 showing Diabetes remained well controlled. Sodium remained low at 129. Unclear cause. differentials were broad, including SIADH, which itself could be due to numerous reasons including age, medicines, cancer, infection etc.  recommended restricting fluids to no more than 1500 ml total a day ( this would include water, coffee soup, ice creams etc) & recheck sodium in few weeks in a lab only apt but this was never done. Rest of kidney function and other electrolytes were wnl. Explained that I would normally refer to nephrology to further evaluate and treat but daughter reported cost was a big limiting factor. Liver tests showed mild elevated alk phos of unknown cause. CBC showed white count minimally elevated but had no evidence of infection at visit and vitals were normal. Was advised to check temperature & blood sugar at night when reported felt warmth to make sure these were not causes. Also hemoglobin was low at 10.8 so continues with anemia and it was slightly less than 2 months prior & also had evidence of iron deficiency though iron storage from ferritin was still wnl. This suggested she might be loosing blood from some where. Most likely reason in some one her age could be GI losses, could not rule out an ulcer, polyp, cancer etc. She reported no weight loss, blood in stools or black stools. She did mention warmth in chest at night and had a history of hiatal hernia so thought could be a possible source from chronic gastritis & slow loss as was asymptomatic. Normally I would recommend an EGD/ colonoscopy as one step to evaluate cause of anemia. Family said they only wanted me to suggest things that were necessary as cost was a big issue to them and could not afford the health care bills. I felt these suggestions were medically indicated yet I understood that cost was prohibitive. Without knowing what exactly was going on I was limited in how I could appropriately treat her. Advised she try Pepcid  20 mg twice a day to see if would help warmth in chest when lying down and if would help anemia and to take a multivitamin with iron OTC daily to see if would help iron. Also advised to sit upright for 45 min after each meal, avoid eating 3 hrs before bedtime, & decrease spicy foods. Counseled that these measures were just symptomatic care but without knowing the exact cause through further investigations and specialists I was not able to prevent things from possibly getting worse or missing something serious  She was referred to the care coordinator as well due to financial constraints.     She did not come back for recheck of her sodium.  It was unclear if she tried Pepcid or dietary lifestyle measures.  Unclear if the warmth in her chest got better or worse.  No-show to follow-up appointment in August.  Was scheduled for EGD colonoscopy with [place] [facility] in September and then rescheduled to this Friday.    Status of Chronic Conditions:  ANEMIA - Patient has a recent history of moderate-severe anemia, which has not been symptomatic. Work up to date has revealed uppercase noted above.  Treatment was recommended multivitamin with iron but unsure if she ever took this.    DIABETES - Patient has a longstanding history of DiabetesType Type II . Patient is being treated with diet and oral agents and denies significant side effects. Control has been good. Complicating factors include but are not limited to: hypertension, hyperlipidemia, chronic kidney disease and morbid obesity .     HYPERLIPIDEMIA - Patient has a long history of significant Hyperlipidemia requiring medication for treatment with recent good control. Patient reports no problems or side effects with the medication.     HYPERTENSION - Patient has longstanding history of HTN , currently denies any symptoms referable to elevated blood pressure. Specifically denies chest pain, palpitations, dyspnea, orthopnea, PND or peripheral edema. Blood pressure readings  have been in normal range. Current medication regimen is as listed below. Patient denies any side effects of medication.     RENAL INSUFFICIENCY - Patient has a longstanding history of moderate-severe chronic renal insufficiency. Last Cr 0.81 5/7/21    Review of Systems  CONSTITUTIONAL: NEGATIVE for fever, chills, change in weight  INTEGUMENTARY/SKIN: NEGATIVE for worrisome rashes, moles or lesions  EYES: NEGATIVE for vision changes or irritation  ENT/MOUTH: NEGATIVE for ear, mouth and throat problems  RESP: NEGATIVE for significant cough or SOB  CV: NEGATIVE for chest pain, palpitations or peripheral edema  GI: NEGATIVE for nausea, abdominal pain, heartburn, or change in bowel habits  : NEGATIVE for frequency, dysuria, or hematuria  MUSCULOSKELETAL: NEGATIVE for significant arthralgias or myalgia  NEURO: NEGATIVE for weakness, dizziness or paresthesias  ENDOCRINE: NEGATIVE for temperature intolerance, skin/hair changes  HEME: NEGATIVE for bleeding problems  PSYCHIATRIC: NEGATIVE for changes in mood or affect    Patient Active Problem List    Diagnosis Date Noted     Iron deficiency 08/13/2021     Priority: Medium     Leukocytosis, unspecified type 08/13/2021     Priority: Medium     Need for diphtheria-tetanus-pertussis (Tdap) vaccine 08/13/2021     Priority: Medium     Need for shingles vaccine 08/13/2021     Priority: Medium     Financial difficulties 05/08/2021     Priority: Medium     Dry skin dermatitis 05/07/2021     Priority: Medium     Stage 3 chronic kidney disease, unspecified whether stage 3a or 3b CKD (H) 02/24/2021     Priority: Medium     H/O left bundle branch block 02/24/2021     Priority: Medium     Sensorineural hearing loss (SNHL), unspecified laterality 02/24/2021     Priority: Medium     Cerebrovascular small vessel disease 02/24/2021     Priority: Medium     On MRI in er 2021       Chronic anemia 02/08/2021     Priority: Medium     Controlled type 2 diabetes mellitus without complication,  without long-term current use of insulin (H) 06/30/2018     Priority: Medium     Hyponatremia 06/30/2018     Priority: Medium     Assoc c acute gastroenteritis 2018 june       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 06/30/2018     Priority: Medium     Noted on CT scan 6/30/2018       Essential hypertension 06/30/2018     Priority: Medium     Hiatal hernia 06/30/2018     Priority: Medium     Without GERD, small size noted on CT scan 6/30/2018       Hyperlipidemia 06/30/2018     Priority: Medium     Multinodular goiter 06/30/2018     Priority: Medium     Vitamin D deficiency 06/30/2018     Priority: Medium      Past Medical History:   Diagnosis Date     Acute hyponatremia 06/30/2018    Assoc c acute gastroenteritis 2018 june     anemia     mild     Benign essential hypertension      Diabetes mellitus (H)      Mass of chest wall, right 06/30/2018    Lipoma? Not seen on ct of 6 18, s/p removal by surgeon      Past Surgical History:   Procedure Laterality Date     SURGICAL PATHOLOGY EXAM Right 2018    chest wall     Current Outpatient Medications   Medication Sig Dispense Refill     atorvastatin (LIPITOR) 40 MG tablet TAKE 1 TABLET(40 MG) BY MOUTH AT BEDTIME 90 tablet 0     blood glucose (ONETOUCH VERIO IQ) test strip TEST BLOOD SUGAR ONCE DAILY 100 strip 0     calcium carbonate 600 mg-vitamin D 400 units (CALTRATE) 600-400 MG-UNIT per tablet Take 1 tablet by mouth       cholecalciferol 50 MCG (2000 UT) CAPS TAKE 1 CAPSULE BY MOUTH DAILY.       losartan (COZAAR) 50 MG tablet TAKE 1 TABLET(50 MG) BY MOUTH DAILY 90 tablet 0     metFORMIN (GLUCOPHAGE) 850 MG tablet Take 1 tablet (850 mg) by mouth daily (with dinner) 90 tablet 1     mineral oil-hydrophilic petrolatum (AQUAPHOR) external ointment Apply topically as needed Apply to skin neck to feet daily after a shower 60 g 0     thin (NO BRAND SPECIFIED) lancets Use with lanceting device. To accompany: Blood Glucose Monitor Brands: per insurance. 100 each 6      "triamcinolone (KENALOG) 0.1 % external ointment Apply topically 2 times daily To flared up itchy skin 2 weeks sparingly as can thin skin 15 g 0     vitamin  s/Minerals TABS Take 1 tablet by mouth         Allergies   Allergen Reactions     Lactose Diarrhea        Social History     Tobacco Use     Smoking status: Never Smoker     Smokeless tobacco: Never Used   Substance Use Topics     Alcohol use: Never     No family history on file.  History   Drug Use Unknown         Objective     /60 (BP Location: Right arm, Patient Position: Chair, Cuff Size: Adult Large)   Pulse 92   Temp 98.4  F (36.9  C) (Temporal)   Resp 16   Ht 1.619 m (5' 3.75\")   Wt 80.3 kg (177 lb)   LMP  (LMP Unknown)   SpO2 96%   BMI 30.62 kg/m      Physical Exam    GENERAL APPEARANCE: healthy, alert, active, no distress, cooperative and obese     EYES: EOMI, PERRL     HENT: nose and mouth without ulcers or lesions, cerumen bilateral and normal cephalic/atraumatic     NECK: no adenopathy, no asymmetry, masses, or scars and thyroid prominent but normal to palpation     RESP: lungs clear to auscultation - no rales, rhonchi or wheezes     CV: regular rates and rhythm, normal S1 S2, no S3 or S4 and no murmur, click or rub     ABDOMEN:  soft, non tender, no HSM or masses and bowel sounds normal, no CVA tenderness     MS: extremities normal- no gross deformities noted, no evidence of inflammation in joints, FROM in all extremities.     SKIN: no suspicious lesions or rashes, tattoos, noted to have erythema in the intertriginous area  inframammary     NEURO: Normal strength and tone, sensory exam grossly normal, mentation intact and speech normal     PSYCH: mentation appears normal. and affect normal/bright     LYMPHATICS: No cervical adenopathy    Recent Labs   Lab Test 05/07/21  1607 02/24/21  1140 02/15/21  1135   HGB 10.8* 11.3* 10.9*    378 378   * 132* 132*   POTASSIUM 5.0 4.3 4.7   CR 0.81 0.79 1.18*   A1C 6.4*  --  6.3*    "     Diagnostics:  Recent Results (from the past 24 hour(s))   Hemoglobin A1c    Collection Time: 11/29/21 12:22 PM   Result Value Ref Range    Hemoglobin A1C 7.3 (H) 0.0 - 5.6 %   UA Macro with Reflex to Micro and Culture - lab collect    Collection Time: 11/29/21 12:22 PM    Specimen: Urine, Midstream   Result Value Ref Range    Color Urine Yellow Colorless, Straw, Light Yellow, Yellow    Appearance Urine Slightly Cloudy (A) Clear    Glucose Urine Negative Negative mg/dL    Bilirubin Urine Negative Negative    Ketones Urine Negative Negative mg/dL    Specific Gravity Urine 1.015 1.003 - 1.035    Blood Urine Trace (A) Negative    pH Urine 5.0 5.0 - 7.0    Protein Albumin Urine Negative Negative mg/dL    Urobilinogen Urine 0.2 0.2, 1.0 E.U./dL    Nitrite Urine Negative Negative    Leukocyte Esterase Urine Small (A) Negative   CBC with platelets and differential    Collection Time: 11/29/21 12:22 PM   Result Value Ref Range    WBC Count 13.0 (H) 4.0 - 11.0 10e3/uL    RBC Count 4.38 3.80 - 5.20 10e6/uL    Hemoglobin 12.4 11.7 - 15.7 g/dL    Hematocrit 39.2 35.0 - 47.0 %    MCV 90 78 - 100 fL    MCH 28.3 26.5 - 33.0 pg    MCHC 31.6 31.5 - 36.5 g/dL    RDW 13.1 10.0 - 15.0 %    Platelet Count 329 150 - 450 10e3/uL    % Neutrophils 71 %    % Lymphocytes 20 %    % Monocytes 7 %    % Eosinophils 2 %    % Basophils 1 %    % Immature Granulocytes 0 %    Absolute Neutrophils 9.2 (H) 1.6 - 8.3 10e3/uL    Absolute Lymphocytes 2.6 0.8 - 5.3 10e3/uL    Absolute Monocytes 0.9 0.0 - 1.3 10e3/uL    Absolute Eosinophils 0.3 0.0 - 0.7 10e3/uL    Absolute Basophils 0.1 0.0 - 0.2 10e3/uL    Absolute Immature Granulocytes 0.0 <=0.0 10e3/uL   Urine Microscopic    Collection Time: 11/29/21 12:22 PM   Result Value Ref Range    Bacteria Urine Moderate (A) None Seen /HPF    RBC Urine None Seen 0-2 /HPF /HPF    WBC Urine 10-25 (A) 0-5 /HPF /HPF    Squamous Epithelials Urine Moderate (A) None Seen /LPF      EKG: Left Bundle Branch Block,  unchanged from previous tracings    EKG shows LBBB unchanged from prior  No symptoms  Had declined echo previously due to cost  ( high bills)     Revised Cardiac Risk Index (RCRI):  The patient has the following serious cardiovascular risks for perioperative complications:   - LBBB ? Coronary Artery Disease (MI, positive stress test, angina, Qs on EKG) = 1 point     RCRI Interpretation: 1 point: Class II (low risk - 0.9% complication rate)         Signed Electronically by: Cee Lozano MD  Copy of this evaluation report is provided to requesting physician.

## 2021-11-30 ENCOUNTER — LAB (OUTPATIENT)
Dept: LAB | Facility: CLINIC | Age: 81
End: 2021-11-30
Payer: COMMERCIAL

## 2021-11-30 ENCOUNTER — TELEPHONE (OUTPATIENT)
Dept: FAMILY MEDICINE | Facility: CLINIC | Age: 81
End: 2021-11-30
Payer: COMMERCIAL

## 2021-11-30 DIAGNOSIS — Z11.59 ENCOUNTER FOR SCREENING FOR OTHER VIRAL DISEASES: ICD-10-CM

## 2021-11-30 DIAGNOSIS — N39.0 URINARY TRACT INFECTION WITHOUT HEMATURIA, SITE UNSPECIFIED: Primary | ICD-10-CM

## 2021-11-30 LAB
ALBUMIN SERPL-MCNC: 3.3 G/DL (ref 3.4–5)
ALP SERPL-CCNC: 156 U/L (ref 40–150)
ALT SERPL W P-5'-P-CCNC: 29 U/L (ref 0–50)
ANION GAP SERPL CALCULATED.3IONS-SCNC: 9 MMOL/L (ref 3–14)
AST SERPL W P-5'-P-CCNC: 23 U/L (ref 0–45)
BACTERIA UR CULT: ABNORMAL
BILIRUB SERPL-MCNC: 0.5 MG/DL (ref 0.2–1.3)
BUN SERPL-MCNC: 26 MG/DL (ref 7–30)
CALCIUM SERPL-MCNC: 10 MG/DL (ref 8.5–10.1)
CHLORIDE BLD-SCNC: 103 MMOL/L (ref 94–109)
CO2 SERPL-SCNC: 24 MMOL/L (ref 20–32)
CREAT SERPL-MCNC: 0.89 MG/DL (ref 0.52–1.04)
FERRITIN SERPL-MCNC: 37 NG/ML (ref 8–252)
GFR SERPL CREATININE-BSD FRML MDRD: 61 ML/MIN/1.73M2
GLUCOSE BLD-MCNC: 102 MG/DL (ref 70–99)
IRON SATN MFR SERPL: 18 % (ref 15–46)
IRON SERPL-MCNC: 69 UG/DL (ref 35–180)
POTASSIUM BLD-SCNC: 6 MMOL/L (ref 3.4–5.3)
PROT SERPL-MCNC: 8.3 G/DL (ref 6.8–8.8)
SODIUM SERPL-SCNC: 136 MMOL/L (ref 133–144)
TIBC SERPL-MCNC: 379 UG/DL (ref 240–430)

## 2021-11-30 PROCEDURE — U0005 INFEC AGEN DETEC AMPLI PROBE: HCPCS

## 2021-11-30 PROCEDURE — U0003 INFECTIOUS AGENT DETECTION BY NUCLEIC ACID (DNA OR RNA); SEVERE ACUTE RESPIRATORY SYNDROME CORONAVIRUS 2 (SARS-COV-2) (CORONAVIRUS DISEASE [COVID-19]), AMPLIFIED PROBE TECHNIQUE, MAKING USE OF HIGH THROUGHPUT TECHNOLOGIES AS DESCRIBED BY CMS-2020-01-R: HCPCS

## 2021-11-30 RX ORDER — SULFAMETHOXAZOLE AND TRIMETHOPRIM 400; 80 MG/1; MG/1
1 TABLET ORAL 2 TIMES DAILY
Qty: 6 TABLET | Refills: 0 | Status: SHIPPED | OUTPATIENT
Start: 2021-11-30 | End: 2021-12-03

## 2021-11-30 NOTE — TELEPHONE ENCOUNTER
Called with [place]  and informed of result.  Pt asks prescription to be sent to [facility] on [address].    Arely Babcock RN  [facility]        ----- Message from Cee Lozano MD sent at 11/30/2021  1:57 PM CST -----  Please call patient. She has a EGD colonoscopy this Friday. Urine culture is indicating a urinary tract infection. I'd like to put on an antibiotic Bactrim 400/80 renal adjusted dose twice a day for 3 days and until the culture comes back to decide further treatment.

## 2021-11-30 NOTE — RESULT ENCOUNTER NOTE
Please call patient. She has a EGD colonoscopy this Friday. Urine culture is indicating a urinary tract infection. I'd like to put on an antibiotic Bactrim 400/80 renal adjusted dose twice a day for 3 days and until the culture comes back to decide further treatment.

## 2021-12-01 LAB — SARS-COV-2 RNA RESP QL NAA+PROBE: NEGATIVE

## 2021-12-01 NOTE — RESULT ENCOUNTER NOTE
-Urine culture is abnormal and grew out e coli bacteria that are sensitive to the antibiotic you have been given.  Complete the medication as prescribed and if you experience new, worsening or persistent symptoms, you should call or return for a recheck.

## 2021-12-02 ENCOUNTER — TELEPHONE (OUTPATIENT)
Dept: FAMILY MEDICINE | Facility: CLINIC | Age: 81
End: 2021-12-02

## 2021-12-02 ENCOUNTER — LAB (OUTPATIENT)
Dept: LAB | Facility: CLINIC | Age: 81
End: 2021-12-02
Payer: COMMERCIAL

## 2021-12-02 DIAGNOSIS — E87.5 HYPERKALEMIA: ICD-10-CM

## 2021-12-02 DIAGNOSIS — E87.5 HYPERKALEMIA: Primary | ICD-10-CM

## 2021-12-02 LAB — POTASSIUM BLD-SCNC: 5.3 MMOL/L (ref 3.4–5.3)

## 2021-12-02 PROCEDURE — 36415 COLL VENOUS BLD VENIPUNCTURE: CPT

## 2021-12-02 PROCEDURE — 84132 ASSAY OF SERUM POTASSIUM: CPT

## 2021-12-02 NOTE — TELEPHONE ENCOUNTER
Dr. Lozano-  1. Patient's daughter, Margy, informed of message and will bring patient in today at 1100 for potassium recheck  2. Patient has colonoscopy tomorrow-is this likely to be cancelled?    Writer called patient with [facility]  #[phone] and reviewed message per Dr. Lozano.    Margy verbalized understanding and in agreement with plan.    Lab appt scheduled today at 1100.  Appt date, time and location confirmed with Margy.    Thank you!  BOB CmN, RN  [facility]

## 2021-12-02 NOTE — TELEPHONE ENCOUNTER
Spoke to patient and daughter earlier today at time of lab and again right now, with help of Bulgarian  on the phone explaining results of normal potassium and to go ahead with the procedure egd colonoscopy as planned  Closing encounter

## 2021-12-02 NOTE — TELEPHONE ENCOUNTER
Potassium form the 29th came back today elevated at 6  This can be dangerous  recommend rechecking potassium stat and if still abnormal will need evaluation

## 2021-12-03 ENCOUNTER — ANESTHESIA EVENT (OUTPATIENT)
Dept: GASTROENTEROLOGY | Facility: CLINIC | Age: 81
End: 2021-12-03
Payer: COMMERCIAL

## 2021-12-03 ENCOUNTER — HOSPITAL ENCOUNTER (OUTPATIENT)
Facility: CLINIC | Age: 81
Discharge: HOME | End: 2021-12-03
Attending: INTERNAL MEDICINE | Admitting: INTERNAL MEDICINE
Payer: COMMERCIAL

## 2021-12-03 ENCOUNTER — ANESTHESIA (OUTPATIENT)
Dept: GASTROENTEROLOGY | Facility: CLINIC | Age: 81
End: 2021-12-03
Payer: COMMERCIAL

## 2021-12-03 VITALS
RESPIRATION RATE: 12 BRPM | SYSTOLIC BLOOD PRESSURE: 163 MMHG | HEART RATE: 90 BPM | OXYGEN SATURATION: 92 % | DIASTOLIC BLOOD PRESSURE: 83 MMHG

## 2021-12-03 LAB
COLONOSCOPY: NORMAL
UPPER GI ENDOSCOPY: NORMAL

## 2021-12-03 PROCEDURE — 250N000009 HC RX 250: Performed by: REGISTERED NURSE

## 2021-12-03 PROCEDURE — 258N000003 HC RX IP 258 OP 636: Performed by: REGISTERED NURSE

## 2021-12-03 PROCEDURE — 43239 EGD BIOPSY SINGLE/MULTIPLE: CPT | Performed by: INTERNAL MEDICINE

## 2021-12-03 PROCEDURE — 370N000017 HC ANESTHESIA TECHNICAL FEE, PER MIN: Performed by: INTERNAL MEDICINE

## 2021-12-03 PROCEDURE — 88305 TISSUE EXAM BY PATHOLOGIST: CPT | Mod: TC | Performed by: INTERNAL MEDICINE

## 2021-12-03 PROCEDURE — 45385 COLONOSCOPY W/LESION REMOVAL: CPT | Performed by: INTERNAL MEDICINE

## 2021-12-03 PROCEDURE — 250N000011 HC RX IP 250 OP 636: Performed by: REGISTERED NURSE

## 2021-12-03 PROCEDURE — 999N000010 HC STATISTIC ANES STAT CODE-CRNA PER MINUTE: Performed by: INTERNAL MEDICINE

## 2021-12-03 RX ORDER — ONDANSETRON 4 MG/1
4 TABLET, ORALLY DISINTEGRATING ORAL EVERY 30 MIN PRN
Status: DISCONTINUED | OUTPATIENT
Start: 2021-12-03 | End: 2021-12-03 | Stop reason: HOSPADM

## 2021-12-03 RX ORDER — SODIUM CHLORIDE, SODIUM LACTATE, POTASSIUM CHLORIDE, CALCIUM CHLORIDE 600; 310; 30; 20 MG/100ML; MG/100ML; MG/100ML; MG/100ML
INJECTION, SOLUTION INTRAVENOUS CONTINUOUS
Status: DISCONTINUED | OUTPATIENT
Start: 2021-12-03 | End: 2021-12-03 | Stop reason: HOSPADM

## 2021-12-03 RX ORDER — SODIUM CHLORIDE, SODIUM LACTATE, POTASSIUM CHLORIDE, CALCIUM CHLORIDE 600; 310; 30; 20 MG/100ML; MG/100ML; MG/100ML; MG/100ML
INJECTION, SOLUTION INTRAVENOUS CONTINUOUS PRN
Status: DISCONTINUED | OUTPATIENT
Start: 2021-12-03 | End: 2021-12-03

## 2021-12-03 RX ORDER — ONDANSETRON 2 MG/ML
4 INJECTION INTRAMUSCULAR; INTRAVENOUS EVERY 30 MIN PRN
Status: DISCONTINUED | OUTPATIENT
Start: 2021-12-03 | End: 2021-12-03 | Stop reason: HOSPADM

## 2021-12-03 RX ORDER — ONDANSETRON 2 MG/ML
INJECTION INTRAMUSCULAR; INTRAVENOUS PRN
Status: DISCONTINUED | OUTPATIENT
Start: 2021-12-03 | End: 2021-12-03

## 2021-12-03 RX ORDER — PROPOFOL 10 MG/ML
INJECTION, EMULSION INTRAVENOUS PRN
Status: DISCONTINUED | OUTPATIENT
Start: 2021-12-03 | End: 2021-12-03

## 2021-12-03 RX ORDER — PROPOFOL 10 MG/ML
INJECTION, EMULSION INTRAVENOUS CONTINUOUS PRN
Status: DISCONTINUED | OUTPATIENT
Start: 2021-12-03 | End: 2021-12-03

## 2021-12-03 RX ORDER — GLYCOPYRROLATE 0.2 MG/ML
INJECTION, SOLUTION INTRAMUSCULAR; INTRAVENOUS PRN
Status: DISCONTINUED | OUTPATIENT
Start: 2021-12-03 | End: 2021-12-03

## 2021-12-03 RX ORDER — LIDOCAINE HYDROCHLORIDE 20 MG/ML
INJECTION, SOLUTION INFILTRATION; PERINEURAL PRN
Status: DISCONTINUED | OUTPATIENT
Start: 2021-12-03 | End: 2021-12-03

## 2021-12-03 RX ADMIN — PHENYLEPHRINE HYDROCHLORIDE 100 MCG: 10 INJECTION INTRAVENOUS at 08:55

## 2021-12-03 RX ADMIN — ONDANSETRON 4 MG: 2 INJECTION INTRAMUSCULAR; INTRAVENOUS at 08:43

## 2021-12-03 RX ADMIN — SODIUM CHLORIDE, POTASSIUM CHLORIDE, SODIUM LACTATE AND CALCIUM CHLORIDE: 600; 310; 30; 20 INJECTION, SOLUTION INTRAVENOUS at 08:37

## 2021-12-03 RX ADMIN — Medication 8 MCG: at 08:40

## 2021-12-03 RX ADMIN — PROPOFOL 150 MCG/KG/MIN: 10 INJECTION, EMULSION INTRAVENOUS at 08:41

## 2021-12-03 RX ADMIN — PROPOFOL 30 MG: 10 INJECTION, EMULSION INTRAVENOUS at 08:41

## 2021-12-03 RX ADMIN — GLYCOPYRROLATE 0.2 MG: 0.2 INJECTION, SOLUTION INTRAMUSCULAR; INTRAVENOUS at 08:40

## 2021-12-03 RX ADMIN — LIDOCAINE HYDROCHLORIDE 60 MG: 20 INJECTION, SOLUTION INFILTRATION; PERINEURAL at 08:40

## 2021-12-03 RX ADMIN — TOPICAL ANESTHETIC 1 SPRAY: 200 SPRAY DENTAL; PERIODONTAL at 08:37

## 2021-12-03 ASSESSMENT — ENCOUNTER SYMPTOMS
SEIZURES: 0
DYSRHYTHMIAS: 0

## 2021-12-03 ASSESSMENT — LIFESTYLE VARIABLES: TOBACCO_USE: 0

## 2021-12-03 NOTE — ANESTHESIA CARE TRANSFER NOTE
Patient: Dano Bowser    Procedure: Procedure(s):  ESOPHAGOGASTRODUODENOSCOPY, WITH BIOPSY  COLONOSCOPY, FLEXIBLE, WITH LESION REMOVAL USING SNARE       Diagnosis: Anemia [D64.9]  Diagnosis Additional Information: No value filed.    Anesthesia Type:   MAC     Note:    Oropharynx: oropharynx clear of all foreign objects and spontaneously breathing  Level of Consciousness: drowsy  Oxygen Supplementation: nasal cannula  Level of Supplemental Oxygen (L/min / FiO2): 2  Independent Airway: airway patency satisfactory and stable  Dentition: dentition unchanged  Vital Signs Stable: post-procedure vital signs reviewed and stable  Report to RN Given: handoff report given  Patient transferred to: Phase II  Comments: At end of procedure, spontaneous respirations, patient alert to voice, able to follow commands. Oxygen via nasal cannula at 2 liters per minute to PACU. Oxygen tubing connected to wall O2 in PACU, SpO2, NiBP, and EKG monitors and alarms on and functioning, Divya Hugger warmer connected to patient gown, report on patient's clinical status given to PACU RN, RN questions answered.  Handoff Report: Identifed the Patient, Identified the Reponsible Provider, Reviewed the pertinent medical history, Discussed the surgical course, Reviewed Intra-OP anesthesia mangement and issues during anesthesia, Set expectations for post-procedure period and Allowed opportunity for questions and acknowledgement of understanding      Vitals:  Vitals Value Taken Time   /71    Temp 97    Pulse 105    Resp 12    SpO2 99%        Electronically Signed By: MINGO Alamo CRNA  December 3, 2021  9:26 AM

## 2021-12-03 NOTE — ANESTHESIA POSTPROCEDURE EVALUATION
Patient: Dano Bowser    Procedure: Procedure(s):  ESOPHAGOGASTRODUODENOSCOPY, WITH BIOPSY  COLONOSCOPY, FLEXIBLE, WITH LESION REMOVAL USING SNARE       Diagnosis:Anemia [D64.9]  Diagnosis Additional Information: No value filed.    Anesthesia Type:  MAC    Note:  Disposition: Outpatient   Postop Pain Control: Uneventful            Sign Out: Well controlled pain   PONV: No   Neuro/Psych: Uneventful            Sign Out: Acceptable/Baseline neuro status   Airway/Respiratory: Uneventful            Sign Out: Acceptable/Baseline resp. status   CV/Hemodynamics: Uneventful            Sign Out: Acceptable CV status   Other NRE: NONE   DID A NON-ROUTINE EVENT OCCUR? No           Last vitals:  Vitals Value Taken Time   /83 12/03/21 1007   Temp     Pulse 90 12/03/21 1012   Resp 12 12/03/21 1012   SpO2 92 % 12/03/21 1012       Electronically Signed By: Idris Rivera MD  December 3, 2021  1:29 PM

## 2021-12-03 NOTE — ANESTHESIA PREPROCEDURE EVALUATION
Anesthesia Pre-Procedure Evaluation    Patient: Dano Bowser   MRN: [id number] : 1940        Preoperative Diagnosis: Anemia [D64.9]    Procedure : Procedure(s):  ESOPHAGOGASTRODUODENOSCOPY (EGD)  COLONOSCOPY          Past Medical History:   Diagnosis Date     Acute hyponatremia 2018    Assoc c acute gastroenteritis 2018     anemia     mild     Benign essential hypertension      Diabetes mellitus (H)      Mass of chest wall, right 2018    Lipoma? Not seen on ct of , s/p removal by surgeon       Past Surgical History:   Procedure Laterality Date     SURGICAL PATHOLOGY EXAM Right 2018    chest wall      Allergies   Allergen Reactions     Lactose Diarrhea      Social History     Tobacco Use     Smoking status: Never Smoker     Smokeless tobacco: Never Used   Substance Use Topics     Alcohol use: Never      Wt Readings from Last 1 Encounters:   21 80.3 kg (177 lb)        Anesthesia Evaluation   Pt has had prior anesthetic. Type: General.    No history of anesthetic complications       ROS/MED HX  ENT/Pulmonary:    (-) tobacco use, asthma and sleep apnea   Neurologic:     (+) no peripheral neuropathy  (-) no seizures and no CVA   Cardiovascular: Comment: LBBB    (+) hypertension----- (-) CAD and arrhythmias   METS/Exercise Tolerance: 1 - Eating, dressing    Hematologic:     (+) anemia,     Musculoskeletal:       GI/Hepatic:     (+) hiatal hernia,  (-) GERD   Renal/Genitourinary:     (+) renal disease, type: CRI,     Endo:     (+) type II DM, Not using insulin,  (-) thyroid disease   Psychiatric/Substance Use:       Infectious Disease:    (-) Recent Fever   Malignancy:       Other:            Physical Exam    Airway  airway exam normal      Mallampati: II   TM distance: > 3 FB   Neck ROM: full   Mouth opening: > 3 cm    Respiratory Devices and Support         Dental       (+) missing and upper dentures      Cardiovascular   cardiovascular exam normal          Pulmonary   pulmonary exam  normal                OUTSIDE LABS:  CBC:   Lab Results   Component Value Date    WBC 13.0 (H) 11/29/2021    WBC 13.4 (H) 05/07/2021    HGB 12.4 11/29/2021    HGB 10.8 (L) 05/07/2021    HCT 39.2 11/29/2021    HCT 33.4 (L) 05/07/2021     11/29/2021     05/07/2021     BMP:   Lab Results   Component Value Date     11/29/2021     (L) 05/07/2021    POTASSIUM 5.3 12/02/2021    POTASSIUM 6.0 (H) 11/29/2021    CHLORIDE 103 11/29/2021    CHLORIDE 97 05/07/2021    CO2 24 11/29/2021    CO2 23 05/07/2021    BUN 26 11/29/2021    BUN 23 05/07/2021    CR 0.89 11/29/2021    CR 0.81 05/07/2021     (H) 11/29/2021     (H) 05/07/2021     COAGS: No results found for: PTT, INR, FIBR  POC: No results found for: BGM, HCG, HCGS  HEPATIC:   Lab Results   Component Value Date    ALBUMIN 3.3 (L) 11/29/2021    PROTTOTAL 8.3 11/29/2021    ALT 29 11/29/2021    AST 23 11/29/2021    ALKPHOS 156 (H) 11/29/2021    BILITOTAL 0.5 11/29/2021     OTHER:   Lab Results   Component Value Date    A1C 7.3 (H) 11/29/2021    BRAYAN 10.0 11/29/2021    TSH 0.52 02/15/2021       Anesthesia Plan    ASA Status:  3   NPO Status:  NPO Appropriate    Anesthesia Type: MAC.              Consents    Anesthesia Plan(s) and associated risks, benefits, and realistic alternatives discussed. Questions answered and patient/representative(s) expressed understanding.    - Discussed:     - Discussed with:  Patient, , Other (See Comment) (daughter)      - Specific Concerns: Specific risks discussed (but not limited to): Possibility of intraoperative awareness..        Postoperative Care    Pain management: IV analgesics, Oral pain medications.   PONV prophylaxis: Ondansetron (or other 5HT-3), Dexamethasone or Solumedrol     Comments:                Idris Rivera MD

## 2021-12-03 NOTE — H&P
[facility] - Pre-Endoscopy History and Physical     Dano Bowser MRN# [id number]   YOB: 1940 Age: 81 year old     Date of Procedure: 12/3/2021  Primary care provider: Chelsea Dominguez  Type of Endoscopy: EGD/Colonoscopy  Reason for Procedure: STEPHON  Type of Anesthesia Anticipated: MAC    HPI:    Dano is a 81 year old female who will be undergoing the above procedure.      A history and physical has been performed, 11/29/21, reviewed.     The patient's medications and allergies have been reviewed. The risks and benefits of the procedure and the sedation options and risks were discussed with the patient/daughter via electronic .  All questions were answered and informed consent was obtained.      She denies a personal or family history of anesthesia complications or bleeding disorders.     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     Essential hypertension     Hiatal hernia     Hyperlipidemia     Multinodular goiter     Vitamin D deficiency     Chronic anemia     Stage 3 chronic kidney disease, unspecified whether stage 3a or 3b CKD (H)     H/O left bundle branch block     Sensorineural hearing loss (SNHL), unspecified laterality     Cerebrovascular small vessel disease     Dry skin dermatitis     Financial difficulties     Iron deficiency     Leukocytosis, unspecified type     Need for diphtheria-tetanus-pertussis (Tdap) vaccine     Need for shingles vaccine        Past Medical History:   Diagnosis Date     Acute hyponatremia 06/30/2018    [facility] acute gastroenteritis 2018 june     anemia     mild     Benign essential hypertension      Diabetes mellitus (H)      Mass of chest wall, right 06/30/2018    Lipoma? Not seen on ct of 6 18, s/p removal by surgeon         Past Surgical History:   Procedure Laterality Date      SURGICAL PATHOLOGY EXAM Right 2018    chest wall       Social History     Tobacco Use     Smoking status: Never Smoker     Smokeless tobacco: Never Used   Substance Use Topics     Alcohol use: Never       History reviewed. No pertinent family history.    Prior to Admission medications    Medication Sig Start Date End Date Taking? Authorizing Provider   atorvastatin (LIPITOR) 40 MG tablet Take 1 tablet (40 mg) by mouth At Bedtime 11/29/21  Yes Cee Lozano MD   calcium carbonate 600 mg-vitamin D 400 units (CALTRATE) 600-400 MG-UNIT per tablet Take 1 tablet by mouth 7/22/19  Yes Reported, Patient   cholecalciferol 50 MCG (2000 UT) CAPS TAKE 1 CAPSULE BY MOUTH DAILY. 11/11/19  Yes Reported, Patient   losartan (COZAAR) 50 MG tablet Take 1 tablet (50 mg) by mouth daily 11/29/21  Yes Cee Lozano MD   metFORMIN (GLUCOPHAGE) 850 MG tablet Take 1 tablet (850 mg) by mouth daily (with dinner) 11/29/21  Yes Cee Lozano MD   sulfamethoxazole-trimethoprim (BACTRIM) 400-80 MG tablet Take 1 tablet by mouth 2 times daily for 3 days 11/30/21 12/3/21 Yes Cee Lozano MD   vitamin  s/Minerals TABS Take 1 tablet by mouth   Yes Reported, Patient   blood glucose (ONETOUCH VERIO IQ) test strip TEST BLOOD SUGAR ONCE DAILY 10/13/21   Cee Lozano MD   carbamide peroxide (DEBROX) 6.5 % otic solution Place 5 drops into both ears 2 times daily for 5 days 11/29/21 12/4/21  Cee Lozano MD   mineral oil-hydrophilic petrolatum (AQUAPHOR) external ointment Apply topically as needed Apply to skin neck to feet daily after a shower 2/24/21   Cee Lozano MD   nystatin (MYCOSTATIN) 644829 UNIT/GM external ointment Apply topically 2 times daily Under breasts biaterally till resolved 11/29/21   Cee Lozano MD   thin (NO BRAND SPECIFIED) lancets Use with lanceting device. To accompany: Blood Glucose Monitor Brands: per insurance. 2/8/21   Cee Lozano MD   triamcinolone (KENALOG) 0.1 % external ointment Apply topically 2 times daily To flared  "up itchy skin 2 weeks sparingly as can thin skin 2/24/21   Cee Lozano MD       Allergies   Allergen Reactions     Lactose Diarrhea        REVIEW OF SYSTEMS:   A comprehensive ROS was negative    PHYSICAL EXAM:   /73   Resp 16   LMP  (LMP Unknown)   SpO2 98%  Estimated body mass index is 30.62 kg/m  as calculated from the following:    Height as of 11/29/21: 1.619 m (5' 3.75\").    Weight as of 11/29/21: 80.3 kg (177 lb).   GENERAL APPEARANCE: alert, and oriented  MENTAL STATUS: alert  RESP: lungs clear to auscultation - no rales, rhonchi or wheezes  CV: regular rates and rhythm      IMPRESSION   STEPHON    PLAN:   Colonoscopy  EGD      The above has been forwarded to the consulting provider.      Signed Electronically by: Ramsey Posey DO  December 3, 2021              "

## 2021-12-06 LAB
PATH REPORT.COMMENTS IMP SPEC: NORMAL
PATH REPORT.COMMENTS IMP SPEC: NORMAL
PATH REPORT.FINAL DX SPEC: NORMAL
PATH REPORT.GROSS SPEC: NORMAL
PATH REPORT.MICROSCOPIC SPEC OTHER STN: NORMAL
PATH REPORT.RELEVANT HX SPEC: NORMAL
PHOTO IMAGE: NORMAL

## 2021-12-06 PROCEDURE — 88305 TISSUE EXAM BY PATHOLOGIST: CPT | Mod: 26 | Performed by: PATHOLOGY

## 2021-12-20 ENCOUNTER — DOCUMENTATION ONLY (OUTPATIENT)
Dept: OTHER | Facility: CLINIC | Age: 81
End: 2021-12-20
Payer: COMMERCIAL

## 2022-01-15 DIAGNOSIS — E11.9 CONTROLLED TYPE 2 DIABETES MELLITUS WITHOUT COMPLICATION, WITHOUT LONG-TERM CURRENT USE OF INSULIN (H): ICD-10-CM

## 2022-01-17 NOTE — TELEPHONE ENCOUNTER
Biguanide Agents Passed 01/15/2022 03:40 AM   Protocol Details  Patient is age 10 or older    Patient has documented A1c within the specified period of time.    Patient's CR is NOT>1.4 OR Patient's EGFR is NOT<45 within past 12 mos.    Patient does NOT have a diagnosis of CHF.    Medication is active on med list    Patient is not pregnant    Patient has not had a positive pregnancy test within the past 12 mos.     Recent (6 mo) or future (30 days) visit within the authorizing provider's specialty

## 2022-02-18 PROBLEM — Z86.19 HISTORY OF HELICOBACTER PYLORI INFECTION: Status: RESOLVED | Noted: 2022-02-18 | Resolved: 2022-02-18

## 2022-02-18 PROBLEM — D72.829 LEUKOCYTOSIS, UNSPECIFIED TYPE: Status: RESOLVED | Noted: 2021-08-13 | Resolved: 2022-02-18

## 2022-02-18 PROBLEM — Z86.19 HISTORY OF HELICOBACTER PYLORI INFECTION: Status: ACTIVE | Noted: 2022-02-18

## 2023-03-06 ENCOUNTER — OFFICE VISIT (OUTPATIENT)
Dept: URGENT CARE | Facility: URGENT CARE | Age: 83
End: 2023-03-06
Payer: MEDICARE

## 2023-03-06 VITALS
TEMPERATURE: 97.9 F | WEIGHT: 177 LBS | RESPIRATION RATE: 14 BRPM | HEART RATE: 70 BPM | BODY MASS INDEX: 30.62 KG/M2 | SYSTOLIC BLOOD PRESSURE: 121 MMHG | OXYGEN SATURATION: 96 % | DIASTOLIC BLOOD PRESSURE: 76 MMHG

## 2023-03-06 DIAGNOSIS — R42 DIZZINESS: ICD-10-CM

## 2023-03-06 DIAGNOSIS — R30.0 DYSURIA: Primary | ICD-10-CM

## 2023-03-06 DIAGNOSIS — R10.84 ABDOMINAL PAIN, GENERALIZED: ICD-10-CM

## 2023-03-06 LAB
ALBUMIN UR-MCNC: NEGATIVE MG/DL
APPEARANCE UR: CLEAR
BILIRUB UR QL STRIP: NEGATIVE
COLOR UR AUTO: YELLOW
GLUCOSE UR STRIP-MCNC: NEGATIVE MG/DL
HGB UR QL STRIP: NEGATIVE
KETONES UR STRIP-MCNC: NEGATIVE MG/DL
LEUKOCYTE ESTERASE UR QL STRIP: NEGATIVE
NITRATE UR QL: NEGATIVE
PH UR STRIP: 5.5 [PH] (ref 5–7)
SP GR UR STRIP: <=1.005 (ref 1–1.03)
UROBILINOGEN UR STRIP-ACNC: 0.2 E.U./DL

## 2023-03-06 PROCEDURE — 99215 OFFICE O/P EST HI 40 MIN: CPT | Performed by: PHYSICIAN ASSISTANT

## 2023-03-06 PROCEDURE — 81003 URINALYSIS AUTO W/O SCOPE: CPT | Performed by: PHYSICIAN ASSISTANT

## 2023-03-06 NOTE — PATIENT INSTRUCTIONS
Sent to ER for dizziness. Patient fell on the floor and is having difficult with mobility. She was less than 2 weeks at ER and diagnosed with UTI. UA was negative. She continues having GI issues despite normal CT scan.

## 2023-03-06 NOTE — PROGRESS NOTES
URGENT CARE VISIT:    SUBJECTIVE:   Dano Bowser is a 82 year old female who presents with abdominal pain for 10 days. Abdominal pain is located over generalized and is described as bloating. Pain timing/severity is described as still present and moderate.  Pain is improved by nothing and worsened by nothing. Associated symptoms include diarrhea. She denies nausea, vomiting, fever, chills and shortness of breath. She has tried nothing with no relief of symptoms.  Appetite is decreased. Risk factors include none. Abdominal surgical history includes none.     An Albanian  was used.    PMH:   Past Medical History:   Diagnosis Date     Acute hyponatremia 06/30/2018    [facility] acute gastroenteritis 2018 june     anemia     mild     Benign essential hypertension      Diabetes mellitus (H)      History of Helicobacter pylori infection 2/18/2022     Leukocytosis, unspecified type 8/13/2021     Mass of chest wall, right 06/30/2018    Lipoma? Not seen on ct of 6 18, s/p removal by surgeon      Allergies: Lactose  Medications:   Current Outpatient Medications   Medication Sig Dispense Refill     atorvastatin (LIPITOR) 40 MG tablet TAKE 1 TABLET(40 MG) BY MOUTH AT BEDTIME 90 tablet 0     blood glucose (ONETOUCH VERIO IQ) test strip TEST BLOOD SUGAR ONCE DAILY 100 strip 0     calcium carbonate 600 mg-vitamin D 400 units (CALTRATE) 600-400 MG-UNIT per tablet Take 1 tablet by mouth       cholecalciferol 50 MCG (2000 UT) CAPS TAKE 1 CAPSULE BY MOUTH DAILY.       losartan (COZAAR) 50 MG tablet TAKE 1 TABLET(50 MG) BY MOUTH DAILY 30 tablet 0     metFORMIN (GLUCOPHAGE) 850 MG tablet TAKE 1 TABLET(850 MG) BY MOUTH DAILY WITH DINNER 90 tablet 1     mineral oil-hydrophilic petrolatum (AQUAPHOR) external ointment Apply topically as needed Apply to skin neck to feet daily after a shower 60 g 0     nystatin (MYCOSTATIN) 340171 UNIT/GM external ointment Apply topically 2 times daily Under breasts biaterally till resolved 30 g 0      thin (NO BRAND SPECIFIED) lancets Use with lanceting device. To accompany: Blood Glucose Monitor Brands: per insurance. 100 each 6     triamcinolone (KENALOG) 0.1 % external ointment Apply topically 2 times daily To flared up itchy skin 2 weeks sparingly as can thin skin 15 g 0     vitamin  s/Minerals TABS Take 1 tablet by mouth       Social History:   Social History     Socioeconomic History     Marital status:      Spouse name: Not on file     Number of children: Not on file     Years of education: Not on file     Highest education level: Not on file   Occupational History     Not on file   Tobacco Use     Smoking status: Never     Smokeless tobacco: Never   Substance and Sexual Activity     Alcohol use: Never     Drug use: Never     Sexual activity: Yes     Partners: Male   Other Topics Concern     Parent/sibling w/ CABG, MI or angioplasty before 65F 55M? Not Asked   Social History Narrative     Not on file     Social Determinants of Health     Financial Resource Strain: Not on file   Food Insecurity: Not on file   Transportation Needs: Not on file   Physical Activity: Not on file   Stress: Not on file   Social Connections: Not on file   Intimate Partner Violence: Not on file   Housing Stability: Not on file       ROS: ROS otherwise found to be negative except as noted above.     OBJECTIVE:  /76 (BP Location: Right arm, Patient Position: Sitting, Cuff Size: Adult Regular)   Pulse 70   Temp 97.9  F (36.6  C) (Temporal)   Resp 14   Wt 80.3 kg (177 lb)   LMP  (LMP Unknown)   SpO2 96%   BMI 30.62 kg/m    GENERAL APPEARANCE: healthy, alert and no distress  EYES: EOMI,  PERRL, conjunctiva clear  RESP: lungs clear to auscultation - no rales, rhonchi or wheezes  CV: regular rates and rhythm, normal S1 S2, no murmur noted  ABDOMEN:  soft, nontender, no HSM or masses and bowel sounds normal  SKIN: no suspicious lesions or rashes    LABS:  Results for orders placed or performed in visit on 03/06/23   UA  Macroscopic with reflex to Microscopic and Culture     Status: Normal    Specimen: Urine, Midstream   Result Value Ref Range    Color Urine Yellow Colorless, Straw, Light Yellow, Yellow    Appearance Urine Clear Clear    Glucose Urine Negative Negative mg/dL    Bilirubin Urine Negative Negative    Ketones Urine Negative Negative mg/dL    Specific Gravity Urine <=1.005 1.003 - 1.035    Blood Urine Negative Negative    pH Urine 5.5 5.0 - 7.0    Protein Albumin Urine Negative Negative mg/dL    Urobilinogen Urine 0.2 0.2, 1.0 E.U./dL    Nitrite Urine Negative Negative    Leukocyte Esterase Urine Negative Negative    Narrative    Microscopic not indicated        ASSESSMENT:     ICD-10-CM    1. Dysuria  R30.0 UA Macroscopic with reflex to Microscopic and Culture      2. Dizziness  R42       3. Abdominal pain, generalized  R10.84            PLAN:  40 minutes spent on the date of the encounter doing chart review, review of outside records, review of test results, interpretation of tests, patient visit and documentation   Patient Instructions   Sent to ER for dizziness. Patient fell on the floor and is having difficult with mobility. She was less than 2 weeks at ER and diagnosed with UTI. UA was negative. She continues having GI issues despite normal CT scan. Patient verbalized understanding and is agreeable to plan. The patient was discharged ambulatory and in stable condition.    Karuna Banerjee PA-C ....................  3/6/2023   4:09 PM

## 2023-05-16 NOTE — TELEPHONE ENCOUNTER
Please malinda daughter with  to discuss below    Sodium remains stable but on lower end at 132  Suspect from diuretic use  advise to change losartan/ hctz 50 -12.5 to losartan 50 mg alone as BP on low end to day   Monitor BP at home and call if trending consistently above 140/9 then could adjust losartan up if needed  Will plan to check sodium again when I see her back  I can send this change in to their pharmacy if they agree     BMP shows kidney function improved back to her baseline  Ok to continue metformin safely  Glucose elevated, ok as due to non fasting sample and diabetes, would be good to check at home but reported strip requested not covered and alternative suggested by pharmacy also not covered. Care coordinator referral made with regard to that     Hemoglobin is decreased indicating anemia.  Anemia can cause fatigue and, occasionally, light-headedness. It is slightly improved & at least does indicate current signs of active bleeding. 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 Without doing further workup I cannot tell. Discussed at visit, offered specialist eval ( hematology) , family concerned about cost, desired instead to monitor labs/ for symptoms , will recheck in 3 months ( cbc, ferritin,)  -White blood cell and platelet counts were normal.   [NL] : warm, clear [Erythematous Oropharynx] : erythematous oropharynx [Lethargic] : not lethargic [Toxic] : not toxic [Stridor] : no stridor [Erythema] : no erythema [Clear Rhinorrhea] : clear rhinorrhea [Anterior Cervical] : anterior cervical [FreeTextEntry3] : difficult to examine [de-identified] : difficult to examine -- MMM

## 2025-07-19 ENCOUNTER — OFFICE VISIT (OUTPATIENT)
Dept: URGENT CARE | Facility: URGENT CARE | Age: 85
End: 2025-07-19
Payer: COMMERCIAL

## 2025-07-19 VITALS
BODY MASS INDEX: 28.37 KG/M2 | WEIGHT: 164 LBS | DIASTOLIC BLOOD PRESSURE: 65 MMHG | TEMPERATURE: 97.6 F | OXYGEN SATURATION: 95 % | HEART RATE: 87 BPM | SYSTOLIC BLOOD PRESSURE: 122 MMHG

## 2025-07-19 DIAGNOSIS — R39.89 URINARY PROBLEM: Primary | ICD-10-CM

## 2025-07-19 DIAGNOSIS — H10.13 ALLERGIC CONJUNCTIVITIS, BILATERAL: ICD-10-CM

## 2025-07-19 DIAGNOSIS — N30.00 ACUTE CYSTITIS WITHOUT HEMATURIA: ICD-10-CM

## 2025-07-19 LAB
ALBUMIN SERPL-MCNC: 3.4 G/DL (ref 3.4–5)
ALBUMIN UR-MCNC: NEGATIVE MG/DL
ALP SERPL-CCNC: 129 U/L (ref 40–150)
ALT SERPL W P-5'-P-CCNC: 20 U/L (ref 0–50)
ANION GAP SERPL CALCULATED.3IONS-SCNC: 9 MMOL/L (ref 3–14)
APPEARANCE UR: CLEAR
AST SERPL W P-5'-P-CCNC: 25 U/L (ref 0–45)
BACTERIA #/AREA URNS HPF: ABNORMAL /HPF
BASOPHILS # BLD AUTO: 0.1 10E3/UL (ref 0–0.2)
BASOPHILS NFR BLD AUTO: 1 %
BILIRUB SERPL-MCNC: 0.7 MG/DL (ref 0.2–1.3)
BILIRUB UR QL STRIP: NEGATIVE
BUN SERPL-MCNC: 28 MG/DL (ref 7–30)
CALCIUM SERPL-MCNC: 10 MG/DL (ref 8.5–10.1)
CHLORIDE BLD-SCNC: 103 MMOL/L (ref 94–109)
CO2 SERPL-SCNC: 29 MMOL/L (ref 20–32)
COLOR UR AUTO: YELLOW
CREAT SERPL-MCNC: 0.8 MG/DL (ref 0.52–1.04)
EGFRCR SERPLBLD CKD-EPI 2021: 72 ML/MIN/1.73M2
EOSINOPHIL # BLD AUTO: 0.3 10E3/UL (ref 0–0.7)
EOSINOPHIL NFR BLD AUTO: 3 %
ERYTHROCYTE [DISTWIDTH] IN BLOOD BY AUTOMATED COUNT: 14.2 % (ref 10–15)
GLUCOSE BLD-MCNC: 153 MG/DL (ref 70–99)
GLUCOSE UR STRIP-MCNC: NEGATIVE MG/DL
HCT VFR BLD AUTO: 39.5 % (ref 35–47)
HGB BLD-MCNC: 12.2 G/DL (ref 11.7–15.7)
HGB UR QL STRIP: NEGATIVE
IMM GRANULOCYTES # BLD: 0 10E3/UL
IMM GRANULOCYTES NFR BLD: 0 %
KETONES UR STRIP-MCNC: NEGATIVE MG/DL
LEUKOCYTE ESTERASE UR QL STRIP: ABNORMAL
LYMPHOCYTES # BLD AUTO: 1.9 10E3/UL (ref 0.8–5.3)
LYMPHOCYTES NFR BLD AUTO: 19 %
MCH RBC QN AUTO: 26.7 PG (ref 26.5–33)
MCHC RBC AUTO-ENTMCNC: 30.9 G/DL (ref 31.5–36.5)
MCV RBC AUTO: 86 FL (ref 78–100)
MONOCYTES # BLD AUTO: 0.6 10E3/UL (ref 0–1.3)
MONOCYTES NFR BLD AUTO: 6 %
NEUTROPHILS # BLD AUTO: 7.4 10E3/UL (ref 1.6–8.3)
NEUTROPHILS NFR BLD AUTO: 73 %
NITRATE UR QL: NEGATIVE
PH UR STRIP: 5.5 [PH] (ref 5–7)
PLATELET # BLD AUTO: 305 10E3/UL (ref 150–450)
POTASSIUM BLD-SCNC: 4.9 MMOL/L (ref 3.4–5.3)
PROT SERPL-MCNC: 7.7 G/DL (ref 6.8–8.8)
RBC # BLD AUTO: 4.57 10E6/UL (ref 3.8–5.2)
RBC #/AREA URNS AUTO: ABNORMAL /HPF
SODIUM SERPL-SCNC: 141 MMOL/L (ref 135–145)
SP GR UR STRIP: 1.01 (ref 1–1.03)
UROBILINOGEN UR STRIP-ACNC: 0.2 E.U./DL
WBC # BLD AUTO: 10.2 10E3/UL (ref 4–11)
WBC #/AREA URNS AUTO: ABNORMAL /HPF
WBC CLUMPS #/AREA URNS HPF: PRESENT /HPF

## 2025-07-19 PROCEDURE — 99214 OFFICE O/P EST MOD 30 MIN: CPT | Performed by: FAMILY MEDICINE

## 2025-07-19 PROCEDURE — 80053 COMPREHEN METABOLIC PANEL: CPT | Performed by: FAMILY MEDICINE

## 2025-07-19 PROCEDURE — 3078F DIAST BP <80 MM HG: CPT | Performed by: FAMILY MEDICINE

## 2025-07-19 PROCEDURE — 3074F SYST BP LT 130 MM HG: CPT | Performed by: FAMILY MEDICINE

## 2025-07-19 PROCEDURE — 36415 COLL VENOUS BLD VENIPUNCTURE: CPT | Performed by: FAMILY MEDICINE

## 2025-07-19 PROCEDURE — 85025 COMPLETE CBC W/AUTO DIFF WBC: CPT | Performed by: FAMILY MEDICINE

## 2025-07-19 PROCEDURE — 81001 URINALYSIS AUTO W/SCOPE: CPT | Performed by: FAMILY MEDICINE

## 2025-07-19 RX ORDER — SULFAMETHOXAZOLE AND TRIMETHOPRIM 800; 160 MG/1; MG/1
1 TABLET ORAL 2 TIMES DAILY
Qty: 14 TABLET | Refills: 0 | Status: SHIPPED | OUTPATIENT
Start: 2025-07-19 | End: 2025-07-26

## 2025-07-19 RX ORDER — KETOTIFEN FUMARATE 0.35 MG/ML
1 SOLUTION/ DROPS OPHTHALMIC 2 TIMES DAILY
Qty: 10 ML | Refills: 0 | Status: SHIPPED | OUTPATIENT
Start: 2025-07-19

## 2025-07-19 NOTE — PROGRESS NOTES
Assess/ Plan    Urinary problem     - UA Macroscopic with reflex to Microscopic and Culture - Clinic Collect  - CBC with platelets and differential; Future  - Comprehensive metabolic panel (BMP + Alb, Alk Phos, ALT, AST, Total. Bili, TP); Future  - CBC with platelets and differential  - Comprehensive metabolic panel (BMP + Alb, Alk Phos, ALT, AST, Total. Bili, TP)  - UA Microscopic with Reflex to Culture  - Urine Culture Aerobic Bacterial - lab collect; Future    Acute cystitis without hematuria     - sulfamethoxazole-trimethoprim (BACTRIM DS) 800-160 MG tablet; Take 1 tablet by mouth 2 times daily for 7 days.    History of ESBL that responded to Bactrim recently-  will treat with Bactrim and adjust if needed after urine culture      Patient was reassured that today's testing and physical exam show no signs of severe pathology  If worsening symptoms with with altered neurologic function, chest pain and/or shortness of breath go to ER       Allergic conjunctivitis, bilateral     - ketotifen fumarate 0.035% 0.035 % SOLN ophthalmic solution; Place 1 drop into both eyes 2 times daily.    Discussed that smoke from [employer] has caused many more eye symptoms in the community                   ----------------------------------------------------------------------------------------------------------------------------------    SUBJECTIVE:   Chief Complaint   Patient presents with    Dysuria    Fatigue           Results     Sodium and potassium level concerns because was abnormal before per daughter      Patient is accompanied by her daughter and Green[place]ic phone  was used    Dano Bowser is a 84 year old female complains of feeling hot and weaker than usual and denies vertigo or spinning sensation     Denies cough, shortness of breath, acute respiratory illness, abdominal discomfort, nausea, vomiting, diarrhea, constipation, dysuria   Patient denies chest pain, irregular heart rhythm, , paralysis, paresthesias  She  has a history of urinary incontinence and has had recurrent urinary tract infections.  Her medical record mentions an ESBL UTI a month ago with need to change her to treatment with Bactrim,  though her urine culture results are not noted in her medical record    Daughter is concerned about level of sodium and potassium due to low levels in the past    Timing: gradual onset and still present;      Has also had itchy eyelids bilaterally for the past week-  no pain of the eyeballs,  no mattering or eye drainage    Her daughter took her to [facility] yesterday, but left without being seen after waiting about 6 hours       Past Medical History:   Diagnosis Date    Acute hyponatremia 06/30/2018    [facility] c acute gastroenteritis 2018 june    anemia     mild    Benign essential hypertension     Diabetes mellitus (H)     History of Helicobacter pylori infection 2/18/2022    Leukocytosis, unspecified type 8/13/2021    Mass of chest wall, right 06/30/2018    Lipoma? 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    Essential hypertension    Hiatal hernia    Hyperlipidemia    Multinodular goiter    Vitamin D deficiency    Chronic anemia    Stage 3 chronic kidney disease, unspecified whether stage 3a or 3b CKD (H)    H/O left bundle branch block    Sensorineural hearing loss (SNHL), unspecified laterality    Cerebrovascular small vessel disease    Dry skin dermatitis    Financial difficulties    Iron deficiency    Need for diphtheria-tetanus-pertussis (Tdap) vaccine    Need for shingles vaccine       ALLERGIES:  Lactose    Current Outpatient Medications   Medication Sig Dispense Refill    ketotifen fumarate 0.035% 0.035 % SOLN ophthalmic solution Place 1 drop into both eyes 2 times daily. 10 mL 0     sulfamethoxazole-trimethoprim (BACTRIM DS) 800-160 MG tablet Take 1 tablet by mouth 2 times daily for 7 days. 14 tablet 0    atorvastatin (LIPITOR) 40 MG tablet TAKE 1 TABLET(40 MG) BY MOUTH AT BEDTIME 90 tablet 0    blood glucose (ONETOUCH VERIO IQ) test strip TEST BLOOD SUGAR ONCE DAILY 100 strip 0    calcium carbonate 600 mg-vitamin D 400 units (CALTRATE) 600-400 MG-UNIT per tablet Take 1 tablet by mouth      cholecalciferol 50 MCG (2000 UT) CAPS TAKE 1 CAPSULE BY MOUTH DAILY.      losartan (COZAAR) 50 MG tablet TAKE 1 TABLET(50 MG) BY MOUTH DAILY 30 tablet 0    metFORMIN (GLUCOPHAGE) 850 MG tablet TAKE 1 TABLET(850 MG) BY MOUTH DAILY WITH DINNER 90 tablet 1    mineral oil-hydrophilic petrolatum (AQUAPHOR) external ointment Apply topically as needed Apply to skin neck to feet daily after a shower 60 g 0    nystatin (MYCOSTATIN) 660009 UNIT/GM external ointment Apply topically 2 times daily Under breasts biaterally till resolved 30 g 0    thin (NO BRAND SPECIFIED) lancets Use with lanceting device. To accompany: Blood Glucose Monitor Brands: per insurance. 100 each 6    triamcinolone (KENALOG) 0.1 % external ointment Apply topically 2 times daily To flared up itchy skin 2 weeks sparingly as can thin skin 15 g 0    vitamin  s/Minerals TABS Take 1 tablet by mouth       No current facility-administered medications for this visit.          ROS:  CONSTITUTIONAL:NEGATIVE for fever, chills,    INTEGUMENTARY/SKIN: NEGATIVE for worrisome rashes,  or lesions  EYES: NEGATIVE for vision changes or irritation  ENT/MOUTH: NEGATIVE for ear, mouth and throat problems  RESP:NEGATIVE for significant cough or SOB    OBJECTIVE:  /65 (BP Location: Right arm, Patient Position: Sitting, Cuff Size: Adult Large)   Pulse 87   Temp 97.6  F (36.4  C) (Temporal)   Wt 74.4 kg (164 lb)   LMP  (LMP Unknown)   SpO2 95%   BMI 28.37 kg/m    Appears   in no apparent distress. .  No coughing  HEENT:  Ears  Normal ,  Mouth normal  Cranial  nerves 2 through 12 grossly intact and No facial droop, no lid lag, normal facial features,  eyelids with little dry and scaly skin from rubbing  NECK:  Supple. No adenopathy or masses in the neck or supraclavicular regions. Cranial nerves are normal.   EYES . PERRLA. EOM's intact.   HEART: S1 and S2 normal, no murmurs, clicks, gallops or rubs. Regular rate and rhythm.  LUNGS:  Chest is clear; no wheezes or rales. No edema or JVD.   ABDOMEN:  Soft, non-tender, normal bowel sounds, no masses, no organomegaly   NEURO: . Mental status normal. Gait and station normal. Romberg negative. Cerebellar function is normal.  Walks on toes, heels and tandem walk without difficulty .  Finger- nose accurate.   Heel- shin accurate  Cranial nerves grossly intact.  Sensation equal and intact in all extremities    Results for orders placed or performed in visit on 07/19/25   UA Macroscopic with reflex to Microscopic and Culture - Clinic Collect     Status: Abnormal    Specimen: Urine, Clean Catch   Result Value Ref Range    Color Urine Yellow Colorless, Straw, Light Yellow, Yellow    Appearance Urine Clear Clear    Glucose Urine Negative Negative mg/dL    Bilirubin Urine Negative Negative    Ketones Urine Negative Negative mg/dL    Specific Gravity Urine 1.010 1.003 - 1.035    Blood Urine Negative Negative    pH Urine 5.5 5.0 - 7.0    Protein Albumin Urine Negative Negative mg/dL    Urobilinogen Urine 0.2 0.2, 1.0 E.U./dL    Nitrite Urine Negative Negative    Leukocyte Esterase Urine Trace (A) Negative   Comprehensive metabolic panel (BMP + Alb, Alk Phos, ALT, AST, Total. Bili, TP)     Status: Abnormal   Result Value Ref Range    Sodium 141 135 - 145 mmol/L    Potassium 4.9 3.4 - 5.3 mmol/L    Chloride 103 94 - 109 mmol/L    Carbon Dioxide (CO2) 29 20 - 32 mmol/L    Anion Gap 9 3 - 14 mmol/L    Urea Nitrogen 28 7 - 30 mg/dL    Creatinine 0.80 0.52 - 1.04 mg/dL    GFR Estimate 72 >60 mL/min/1.73m2    Calcium 10.0 8.5 - 10.1 mg/dL     Glucose 153 (H) 70 - 99 mg/dL    Alkaline Phosphatase 129 40 - 150 U/L    AST 25 0 - 45 U/L    ALT 20 0 - 50 U/L    Protein Total 7.7 6.8 - 8.8 g/dL    Albumin 3.4 3.4 - 5.0 g/dL    Bilirubin Total 0.7 0.2 - 1.3 mg/dL   CBC with platelets and differential     Status: Abnormal   Result Value Ref Range    WBC Count 10.2 4.0 - 11.0 10e3/uL    RBC Count 4.57 3.80 - 5.20 10e6/uL    Hemoglobin 12.2 11.7 - 15.7 g/dL    Hematocrit 39.5 35.0 - 47.0 %    MCV 86 78 - 100 fL    MCH 26.7 26.5 - 33.0 pg    MCHC 30.9 (L) 31.5 - 36.5 g/dL    RDW 14.2 10.0 - 15.0 %    Platelet Count 305 150 - 450 10e3/uL    % Neutrophils 73 %    % Lymphocytes 19 %    % Monocytes 6 %    % Eosinophils 3 %    % Basophils 1 %    % Immature Granulocytes 0 %    Absolute Neutrophils 7.4 1.6 - 8.3 10e3/uL    Absolute Lymphocytes 1.9 0.8 - 5.3 10e3/uL    Absolute Monocytes 0.6 0.0 - 1.3 10e3/uL    Absolute Eosinophils 0.3 0.0 - 0.7 10e3/uL    Absolute Basophils 0.1 0.0 - 0.2 10e3/uL    Absolute Immature Granulocytes 0.0 <=0.4 10e3/uL   UA Microscopic with Reflex to Culture     Status: Abnormal   Result Value Ref Range    Bacteria Urine Many (A) None Seen /HPF    RBC Urine None Seen 0-2 /HPF /HPF    WBC Urine 5-10 (A) 0-5 /HPF /HPF    WBC Clumps Urine Present (A) None Seen /HPF    Narrative    Urine Culture not indicated   CBC with platelets and differential     Status: Abnormal    Narrative    The following orders were created for panel order CBC with platelets and differential.  Procedure                               Abnormality         Status                     ---------                               -----------         ------                     CBC with platelets and ...[[id number]]  Abnormal            Final result                 Please view results for these tests on the individual orders.

## 2025-07-19 NOTE — PROGRESS NOTES
Urgent Care Clinic Visit    Chief Complaint   Patient presents with    Dysuria    Fatigue           Results     Sodium and potassium level concerns because was abnormal before per daughter                7/19/2025    12:30 PM   Additional Questions   Roomed by KIM Francis   Accompanied by daughter     Pre-Provider Visit Orders- Urinalysis UA/UC  Patient reports the following symptoms:  possible urinary tract infection (UTI) , possible bladder infection , and discomfort, pain or burning with urination   Does the patient report any of the following symptoms: vaginal discharge, vaginal itching, possible yeast infection, has a urinary catheter in place, or unable to void in a specimen cup?  No